# Patient Record
Sex: FEMALE | Race: WHITE | NOT HISPANIC OR LATINO | Employment: FULL TIME | URBAN - METROPOLITAN AREA
[De-identification: names, ages, dates, MRNs, and addresses within clinical notes are randomized per-mention and may not be internally consistent; named-entity substitution may affect disease eponyms.]

---

## 2022-07-18 ENCOUNTER — HOSPITAL ENCOUNTER (EMERGENCY)
Age: 47
Discharge: HOME | End: 2022-07-18
Attending: EMERGENCY MEDICINE

## 2022-07-18 ENCOUNTER — APPOINTMENT (OUTPATIENT)
Dept: GENERAL RADIOLOGY | Age: 47
End: 2022-07-18
Attending: EMERGENCY MEDICINE

## 2022-07-18 VITALS
DIASTOLIC BLOOD PRESSURE: 66 MMHG | BODY MASS INDEX: 24.93 KG/M2 | RESPIRATION RATE: 16 BRPM | WEIGHT: 174.16 LBS | HEART RATE: 55 BPM | OXYGEN SATURATION: 100 % | HEIGHT: 70 IN | SYSTOLIC BLOOD PRESSURE: 116 MMHG | TEMPERATURE: 98.1 F

## 2022-07-18 DIAGNOSIS — S90.32XA CONTUSION OF LEFT FOOT, INITIAL ENCOUNTER: Primary | ICD-10-CM

## 2022-07-18 PROCEDURE — 73630 X-RAY EXAM OF FOOT: CPT

## 2022-07-18 PROCEDURE — 99283 EMERGENCY DEPT VISIT LOW MDM: CPT | Performed by: EMERGENCY MEDICINE

## 2022-07-18 PROCEDURE — 99283 EMERGENCY DEPT VISIT LOW MDM: CPT

## 2022-07-18 ASSESSMENT — PAIN SCALES - GENERAL: PAINLEVEL_OUTOF10: 8

## 2022-07-18 ASSESSMENT — PAIN DESCRIPTION - PAIN TYPE: TYPE: ACUTE PAIN

## 2022-09-19 ENCOUNTER — OFFICE VISIT (OUTPATIENT)
Dept: RHEUMATOLOGY | Facility: CLINIC | Age: 47
End: 2022-09-19
Payer: COMMERCIAL

## 2022-09-19 ENCOUNTER — HOSPITAL ENCOUNTER (OUTPATIENT)
Dept: GENERAL RADIOLOGY | Facility: HOSPITAL | Age: 47
Discharge: HOME | End: 2022-09-19
Attending: INTERNAL MEDICINE

## 2022-09-19 ENCOUNTER — LAB ENCOUNTER (OUTPATIENT)
Dept: LAB | Facility: HOSPITAL | Age: 47
End: 2022-09-19
Attending: INTERNAL MEDICINE

## 2022-09-19 VITALS — HEIGHT: 70 IN | SYSTOLIC BLOOD PRESSURE: 105 MMHG | HEART RATE: 49 BPM | DIASTOLIC BLOOD PRESSURE: 68 MMHG

## 2022-09-19 DIAGNOSIS — R21 RASH: ICD-10-CM

## 2022-09-19 DIAGNOSIS — M25.50 POLYARTHRALGIA: ICD-10-CM

## 2022-09-19 DIAGNOSIS — G89.29 CHRONIC BILATERAL LOW BACK PAIN WITHOUT SCIATICA: ICD-10-CM

## 2022-09-19 DIAGNOSIS — M54.50 CHRONIC BILATERAL LOW BACK PAIN WITHOUT SCIATICA: ICD-10-CM

## 2022-09-19 DIAGNOSIS — M25.50 POLYARTHRALGIA: Primary | ICD-10-CM

## 2022-09-19 LAB
CRP SERPL-MCNC: <0.29 MG/DL (ref ?–0.3)
ERYTHROCYTE [SEDIMENTATION RATE] IN BLOOD: 6 MM/HR

## 2022-09-19 PROCEDURE — 86140 C-REACTIVE PROTEIN: CPT | Performed by: INTERNAL MEDICINE

## 2022-09-19 PROCEDURE — 73130 X-RAY EXAM OF HAND: CPT | Performed by: INTERNAL MEDICINE

## 2022-09-19 PROCEDURE — 81374 HLA I TYPING 1 ANTIGEN LR: CPT

## 2022-09-19 PROCEDURE — 72110 X-RAY EXAM L-2 SPINE 4/>VWS: CPT | Performed by: INTERNAL MEDICINE

## 2022-09-19 PROCEDURE — 85652 RBC SED RATE AUTOMATED: CPT | Performed by: INTERNAL MEDICINE

## 2022-09-19 PROCEDURE — 36415 COLL VENOUS BLD VENIPUNCTURE: CPT | Performed by: INTERNAL MEDICINE

## 2022-09-19 PROCEDURE — 72202 X-RAY EXAM SI JOINTS 3/> VWS: CPT | Performed by: INTERNAL MEDICINE

## 2022-09-19 RX ORDER — SERTRALINE HYDROCHLORIDE 100 MG/1
150 TABLET, FILM COATED ORAL DAILY
COMMUNITY
Start: 2022-09-15

## 2022-09-19 RX ORDER — NAPROXEN 500 MG/1
500 TABLET ORAL 2 TIMES DAILY WITH MEALS
Qty: 60 TABLET | Refills: 0 | Status: SHIPPED | OUTPATIENT
Start: 2022-09-19

## 2022-09-19 RX ORDER — GABAPENTIN 600 MG/1
600 TABLET ORAL 2 TIMES DAILY
COMMUNITY
Start: 2022-09-15

## 2022-09-19 RX ORDER — LEVOTHYROXINE SODIUM 175 UG/1
175 TABLET ORAL
COMMUNITY
Start: 2022-07-17

## 2022-09-19 NOTE — PATIENT INSTRUCTIONS
You were seen for joint pain, with this rash around your neck area I am concerned about psoriasis and even psoriatic arthritis  Blood work and x-rays today  Try naproxen twice a day for her pain, take with food and not with any other NSAIDs  See dermatology    Miguel Matthews  Address: [address], [facility]  Phone: [phone]

## 2022-09-23 LAB — HLA-B27: NEGATIVE

## 2023-01-29 ENCOUNTER — HOSPITAL ENCOUNTER (OUTPATIENT)
Age: 48
Discharge: HOME | End: 2023-01-29
Payer: COMMERCIAL

## 2023-01-29 ENCOUNTER — APPOINTMENT (OUTPATIENT)
Dept: GENERAL RADIOLOGY | Age: 48
End: 2023-01-29
Attending: PHYSICIAN ASSISTANT
Payer: COMMERCIAL

## 2023-01-29 VITALS
BODY MASS INDEX: 24.34 KG/M2 | HEIGHT: 70 IN | WEIGHT: 170 LBS | SYSTOLIC BLOOD PRESSURE: 129 MMHG | HEART RATE: 57 BPM | OXYGEN SATURATION: 98 % | DIASTOLIC BLOOD PRESSURE: 55 MMHG | RESPIRATION RATE: 18 BRPM | TEMPERATURE: 98 F

## 2023-01-29 DIAGNOSIS — M25.511 ACUTE PAIN OF RIGHT SHOULDER: Primary | ICD-10-CM

## 2023-01-29 DIAGNOSIS — M25.511 ARTHRALGIA OF RIGHT ACROMIOCLAVICULAR JOINT: ICD-10-CM

## 2023-01-29 PROCEDURE — 99204 OFFICE O/P NEW MOD 45 MIN: CPT

## 2023-01-29 PROCEDURE — 73030 X-RAY EXAM OF SHOULDER: CPT | Performed by: PHYSICIAN ASSISTANT

## 2023-01-29 PROCEDURE — 96372 THER/PROPH/DIAG INJ SC/IM: CPT

## 2023-01-29 RX ORDER — KETOROLAC TROMETHAMINE 30 MG/ML
30 INJECTION, SOLUTION INTRAMUSCULAR; INTRAVENOUS ONCE
Status: COMPLETED | OUTPATIENT
Start: 2023-01-29 | End: 2023-01-29

## 2023-01-29 RX ORDER — METHYLPREDNISOLONE 4 MG/1
TABLET ORAL
Qty: 1 EACH | Refills: 0 | Status: SHIPPED | OUTPATIENT
Start: 2023-01-29

## 2023-01-29 NOTE — DISCHARGE INSTRUCTIONS
Medrol Dosepak as directed follow-up closely with orthopedics within the next 24 to 48 hours make an appointment. The best treatment for minor injuries is R.I.C.E. and NSAID medications. R.I.C.E. = Rest  Ice  Compression  Elevation      Rest: Do not use the injured body part unnecessarily. If this is a lower extremity, do not take long walks, run or do anything that causes increased pain. Gradually progress to your normal activity, using pain as your guide. Ice: Apply cold compresses to the injured area. The area that is injured is inflammed. Inflammation causes warmth, so ice may give some relief. You may ice through a brace or ace wrap. Compression: Compression to the area will help control swelling. An ace wrap is the most simple form of compression. You can also wear a brace. Elevation: Raise the injured extremity above heart level. This will reduce throbbing pain and swelling associated with injures. Prop the injured extremity up with pillows while lying down.

## 2023-01-29 NOTE — ED INITIAL ASSESSMENT (HPI)
C/o right shoulder injury starting 5 days ago. Pt report unknown cause of injury. Pt denies heavy lifting, falls or new workouts. Limited ROM. Pt unable to hold anything on right side. Pt reports resting and icing shoulder. Pt unable to sleep on right side.

## 2023-01-30 ENCOUNTER — OFFICE VISIT (OUTPATIENT)
Dept: ORTHOPEDICS CLINIC | Facility: CLINIC | Age: 48
End: 2023-01-30
Payer: COMMERCIAL

## 2023-01-30 VITALS — WEIGHT: 170 LBS | BODY MASS INDEX: 24.34 KG/M2 | HEIGHT: 70 IN

## 2023-01-30 DIAGNOSIS — S46.001A INJURY OF RIGHT ROTATOR CUFF, INITIAL ENCOUNTER: Primary | ICD-10-CM

## 2023-01-30 PROCEDURE — 3008F BODY MASS INDEX DOCD: CPT | Performed by: PHYSICIAN ASSISTANT

## 2023-01-30 PROCEDURE — 99204 OFFICE O/P NEW MOD 45 MIN: CPT | Performed by: PHYSICIAN ASSISTANT

## 2023-02-01 ENCOUNTER — OFFICE VISIT (OUTPATIENT)
Dept: ORTHOPEDICS CLINIC | Facility: CLINIC | Age: 48
End: 2023-02-01
Payer: COMMERCIAL

## 2023-02-01 DIAGNOSIS — S46.811A STRAIN OF RIGHT SUBSCAPULARIS MUSCLE, INITIAL ENCOUNTER: ICD-10-CM

## 2023-02-01 DIAGNOSIS — M75.21 BICEPS TENDINITIS OF RIGHT UPPER EXTREMITY: Primary | ICD-10-CM

## 2023-02-01 DIAGNOSIS — S46.111A LABRAL TEAR OF LONG HEAD OF RIGHT BICEPS TENDON, INITIAL ENCOUNTER: ICD-10-CM

## 2023-02-01 RX ORDER — TRIAMCINOLONE ACETONIDE 40 MG/ML
40 INJECTION, SUSPENSION INTRA-ARTICULAR; INTRAMUSCULAR ONCE
Status: COMPLETED | OUTPATIENT
Start: 2023-02-01 | End: 2023-02-01

## 2023-02-01 RX ORDER — KETOROLAC TROMETHAMINE 30 MG/ML
30 INJECTION, SOLUTION INTRAMUSCULAR; INTRAVENOUS ONCE
Status: COMPLETED | OUTPATIENT
Start: 2023-02-01 | End: 2023-02-01

## 2023-02-01 RX ADMIN — KETOROLAC TROMETHAMINE 30 MG: 30 INJECTION, SOLUTION INTRAMUSCULAR; INTRAVENOUS at 11:59:00

## 2023-02-01 RX ADMIN — TRIAMCINOLONE ACETONIDE 40 MG: 40 INJECTION, SUSPENSION INTRA-ARTICULAR; INTRAMUSCULAR at 11:59:00

## 2023-02-01 NOTE — PROCEDURES
Right Shoulder Glenohumeral Joint Injection    Name: Shiela Newman   MRN: [id number]  Date: 2/1/2023     Clinical Indications:   Partial thickness Rotator Cuff Tear with symptoms refractory to conservative measures. After informed consent, the injection site was marked, sterilized with topical chlorhexidine antiseptic, and locally anesthetized with skin refrigerant. The patient was seated upright and the shoulder was exposed. Using sterile technique: 1 mL of 30mg/mL of Ketorolac, 2 mL of 0.5% Bupivicaine, 2 mL of 1% Lidocaine, and 1 mg of 40mg/mL of Triamcinolone (Kenalog) was injected with a Anterior approach utilizing a 21 gauge needle. A band-aid was applied. The patient tolerated the procedure well. Disposition:   Proceed with physical therapy and return for repeat evaluation in 6 weeks. No imaging required at next visit. Bibi Medel. Blue Hodgson MD  Knee, Shoulder, & Elbow Surgery / Sports Medicine Specialist  [facility]  [place] [address], [facility], [address] [place] [address]. [email or web address]  t: [phone]  o: [phone]  f: [phone]

## 2023-02-06 ENCOUNTER — TELEPHONE (OUTPATIENT)
Dept: ORTHOPEDICS CLINIC | Facility: CLINIC | Age: 48
End: 2023-02-06

## 2023-02-06 NOTE — TELEPHONE ENCOUNTER
Received paperwork from [place] today 2/6/23. Sent via email to [facility] April and sent patient mychart regarding SYD & fee.

## 2023-02-07 ENCOUNTER — TELEPHONE (OUTPATIENT)
Dept: ORTHOPEDICS CLINIC | Facility: CLINIC | Age: 48
End: 2023-02-07

## 2023-02-07 NOTE — TELEPHONE ENCOUNTER
I called the patient regarding her [employer] paperwork. If she return the call, please transfer the call to me @ [phone]    Thank you!

## 2023-02-09 NOTE — TELEPHONE ENCOUNTER
Form Received: 02/07/2023  Form Completed: 02/08/2023    Form has been faxed over to [employer] on 02/08/2023. Patient received copy of the form through [employer] as well.

## 2023-03-04 ENCOUNTER — MED REC SCAN ONLY (OUTPATIENT)
Dept: ORTHOPEDICS CLINIC | Facility: CLINIC | Age: 48
End: 2023-03-04

## 2023-03-08 ENCOUNTER — TELEPHONE (OUTPATIENT)
Dept: ORTHOPEDICS CLINIC | Facility: CLINIC | Age: 48
End: 2023-03-08

## 2023-03-08 ENCOUNTER — OFFICE VISIT (OUTPATIENT)
Dept: ORTHOPEDICS CLINIC | Facility: CLINIC | Age: 48
End: 2023-03-08
Payer: COMMERCIAL

## 2023-03-08 DIAGNOSIS — M75.21 BICEPS TENDINITIS OF RIGHT UPPER EXTREMITY: ICD-10-CM

## 2023-03-08 DIAGNOSIS — S43.003A SUBLUXATION OF TENDON OF LONG HEAD OF BICEPS: Primary | ICD-10-CM

## 2023-03-08 PROCEDURE — 99417 PROLNG OP E/M EACH 15 MIN: CPT | Performed by: ORTHOPAEDIC SURGERY

## 2023-03-08 PROCEDURE — 99215 OFFICE O/P EST HI 40 MIN: CPT | Performed by: ORTHOPAEDIC SURGERY

## 2023-03-08 NOTE — PROGRESS NOTES
[facility]  Name: Quiana Pop  MRN: [id number]   : 10/14/1975  Diagnosis:  [x] Subluxation of tendon of long head of biceps [P91.859B]  Disposition:    [x] Ambulatory  [] Overnight for MARIBEL  [] Overnight for observation and pain control  [] Inpatient procedure    Operative Time Required:  2 hrs   Antibiotics: 2 g cefazolin within 60 minutes of surgical incision  Procedure:   Laterality: [x] RIGHT [] LEFT                  [] BILATERAL  Procedures:   [x] Shoulder Arthoscopy  [] Arthrotomy (66563)  [] Arthoscopy/Arthrotomy  [x] Possible Rotator Cuff Repair (22494)  [x] Arthroscopic Biceps Tenodesis (95317)  [x] Subacromial Decompression (39166)  [x] Distal Clavicle Excision (53441)    [] SLAP repair (55297)  [] Bankart repair (66588)    [] Lysis of Adhesions / Manipulation under Anesthesia (26301)  [] Removal of Loose Body (99271)  [] Biceps tenotomy (00453)  [] ORIF clavicle (52155)  [] ORIF Proximal Humerus (24394)  [] ELVIA clavicle (55923)  [] CC ligament reconstruction (42052)   [] Graft Hamstring Auto (30097)  [] Cadaver: Hamstring allograft   [] Total Shoulder Arthroplasty (31562)   [] Reverse Shoulder Arthroplasty (76582)   [] LIPOGEMS (17394)     Additional info:   [] PCP Clearance Needed  [] MRSA  [] C-Arm  [x] TXA at time surgery  [x] Physical Therapy External - [facility]   [x] DME Rx Needed   Implants needed: Arthrex  Positioning Equipment: Yuliet Hamilton  Assistant request: Brain Pacer

## 2023-03-14 ENCOUNTER — TELEPHONE (OUTPATIENT)
Dept: ORTHOPEDICS CLINIC | Facility: CLINIC | Age: 48
End: 2023-03-14

## 2023-03-14 NOTE — TELEPHONE ENCOUNTER
Patient called and states that she was trying to contact her insurance for her Disability forms but there's no response. Mentioned to her to talk to HR if they can provide her with the forms because we usually fill them out,not create them ourselves. If she get a fax from her insurance to inform her right away because her surgery is coming up soon-  03/28 Please advise. Thanks.     Patient can be reached at [phone]

## 2023-03-16 NOTE — TELEPHONE ENCOUNTER
Spoken to the patient and she told me that her insurance company has already faxed over the form to us. However, I have not received any as of this moment. Informed the patient that if in case she received the PDF copy from her , she can also forward it through [employer] and will work on it as soon as we receive it.

## 2023-03-24 RX ORDER — TRAMADOL HYDROCHLORIDE 50 MG/1
TABLET ORAL
Qty: 20 TABLET | Refills: 1 | Status: SHIPPED | OUTPATIENT
Start: 2023-03-24

## 2023-03-24 RX ORDER — ONDANSETRON 4 MG/1
TABLET, FILM COATED ORAL
Qty: 8 TABLET | Refills: 0 | Status: SHIPPED | OUTPATIENT
Start: 2023-03-24

## 2023-03-26 ENCOUNTER — LAB ENCOUNTER (OUTPATIENT)
Dept: LAB | Facility: HOSPITAL | Age: 48
End: 2023-03-26
Attending: ORTHOPAEDIC SURGERY
Payer: COMMERCIAL

## 2023-03-26 DIAGNOSIS — S43.003A SUBLUXATION OF TENDON OF LONG HEAD OF BICEPS: ICD-10-CM

## 2023-03-27 ENCOUNTER — ANESTHESIA EVENT (OUTPATIENT)
Dept: SURGERY | Facility: HOSPITAL | Age: 48
End: 2023-03-27
Payer: COMMERCIAL

## 2023-03-27 ENCOUNTER — TELEPHONE (OUTPATIENT)
Dept: ORTHOPEDICS CLINIC | Facility: CLINIC | Age: 48
End: 2023-03-27

## 2023-03-27 LAB — SARS-COV-2 RNA RESP QL NAA+PROBE: NOT DETECTED

## 2023-03-27 NOTE — TELEPHONE ENCOUNTER
Patient is very upset and would like to speak to someone regarding paperwork that was filled out for her leave from work. Patient was notified that the leave has been denied and she is suppose to be having surgery tomorrow. Patient stated that the paperwork has been filled out incorrectly.  Please advise

## 2023-03-28 ENCOUNTER — HOSPITAL ENCOUNTER (OUTPATIENT)
Facility: HOSPITAL | Age: 48
Setting detail: HOSPITAL OUTPATIENT SURGERY
Discharge: HOME | End: 2023-03-28
Attending: ORTHOPAEDIC SURGERY | Admitting: ORTHOPAEDIC SURGERY
Payer: COMMERCIAL

## 2023-03-28 ENCOUNTER — ANESTHESIA (OUTPATIENT)
Dept: SURGERY | Facility: HOSPITAL | Age: 48
End: 2023-03-28
Payer: COMMERCIAL

## 2023-03-28 VITALS
SYSTOLIC BLOOD PRESSURE: 101 MMHG | BODY MASS INDEX: 25.48 KG/M2 | HEART RATE: 55 BPM | WEIGHT: 178 LBS | RESPIRATION RATE: 20 BRPM | DIASTOLIC BLOOD PRESSURE: 69 MMHG | HEIGHT: 70 IN | TEMPERATURE: 97 F | OXYGEN SATURATION: 100 %

## 2023-03-28 DIAGNOSIS — S43.003A SUBLUXATION OF TENDON OF LONG HEAD OF BICEPS: Primary | ICD-10-CM

## 2023-03-28 DIAGNOSIS — Z98.890 S/P ARTHROSCOPY OF RIGHT SHOULDER: ICD-10-CM

## 2023-03-28 LAB — B-HCG UR QL: NEGATIVE

## 2023-03-28 PROCEDURE — 0RNJ4ZZ RELEASE RIGHT SHOULDER JOINT, PERCUTANEOUS ENDOSCOPIC APPROACH: ICD-10-PCS | Performed by: ORTHOPAEDIC SURGERY

## 2023-03-28 PROCEDURE — 0LS34ZZ REPOSITION RIGHT UPPER ARM TENDON, PERCUTANEOUS ENDOSCOPIC APPROACH: ICD-10-PCS | Performed by: ORTHOPAEDIC SURGERY

## 2023-03-28 PROCEDURE — 81025 URINE PREGNANCY TEST: CPT

## 2023-03-28 PROCEDURE — 0PB94ZZ EXCISION OF RIGHT CLAVICLE, PERCUTANEOUS ENDOSCOPIC APPROACH: ICD-10-PCS | Performed by: ORTHOPAEDIC SURGERY

## 2023-03-28 PROCEDURE — 0RHJ44Z INSERTION OF INTERNAL FIXATION DEVICE INTO RIGHT SHOULDER JOINT, PERCUTANEOUS ENDOSCOPIC APPROACH: ICD-10-PCS | Performed by: ORTHOPAEDIC SURGERY

## 2023-03-28 PROCEDURE — 76942 ECHO GUIDE FOR BIOPSY: CPT | Performed by: ANESTHESIOLOGY

## 2023-03-28 DEVICE — PEEK SWIVELOCK C,4.75X19.1MM
Type: IMPLANTABLE DEVICE | Site: SHOULDER | Status: FUNCTIONAL
Brand: ARTHREX®

## 2023-03-28 RX ORDER — ACETAMINOPHEN 500 MG
1000 TABLET ORAL ONCE AS NEEDED
Status: COMPLETED | OUTPATIENT
Start: 2023-03-28 | End: 2023-03-28

## 2023-03-28 RX ORDER — NALOXONE HYDROCHLORIDE 0.4 MG/ML
80 INJECTION, SOLUTION INTRAMUSCULAR; INTRAVENOUS; SUBCUTANEOUS AS NEEDED
Status: DISCONTINUED | OUTPATIENT
Start: 2023-03-28 | End: 2023-03-28

## 2023-03-28 RX ORDER — PROCHLORPERAZINE EDISYLATE 5 MG/ML
INJECTION INTRAMUSCULAR; INTRAVENOUS
Status: COMPLETED
Start: 2023-03-28 | End: 2023-03-28

## 2023-03-28 RX ORDER — QUETIAPINE FUMARATE 25 MG/1
50 TABLET, FILM COATED ORAL NIGHTLY
COMMUNITY
Start: 2022-08-16

## 2023-03-28 RX ORDER — HYDROCODONE BITARTRATE AND ACETAMINOPHEN 5; 325 MG/1; MG/1
2 TABLET ORAL ONCE AS NEEDED
Status: COMPLETED | OUTPATIENT
Start: 2023-03-28 | End: 2023-03-28

## 2023-03-28 RX ORDER — TRANEXAMIC ACID 10 MG/ML
1000 INJECTION, SOLUTION INTRAVENOUS ONCE
Status: COMPLETED | OUTPATIENT
Start: 2023-03-28 | End: 2023-03-28

## 2023-03-28 RX ORDER — HYDROMORPHONE HYDROCHLORIDE 1 MG/ML
0.2 INJECTION, SOLUTION INTRAMUSCULAR; INTRAVENOUS; SUBCUTANEOUS EVERY 5 MIN PRN
Status: DISCONTINUED | OUTPATIENT
Start: 2023-03-28 | End: 2023-03-28

## 2023-03-28 RX ORDER — MIDAZOLAM HYDROCHLORIDE 1 MG/ML
1 INJECTION INTRAMUSCULAR; INTRAVENOUS EVERY 5 MIN PRN
Status: DISCONTINUED | OUTPATIENT
Start: 2023-03-28 | End: 2023-03-28

## 2023-03-28 RX ORDER — CEFAZOLIN SODIUM/WATER 2 G/20 ML
2 SYRINGE (ML) INTRAVENOUS ONCE
Status: COMPLETED | OUTPATIENT
Start: 2023-03-28 | End: 2023-03-28

## 2023-03-28 RX ORDER — ONDANSETRON 2 MG/ML
INJECTION INTRAMUSCULAR; INTRAVENOUS
Status: COMPLETED
Start: 2023-03-28 | End: 2023-03-28

## 2023-03-28 RX ORDER — ACETAMINOPHEN 500 MG
1000 TABLET ORAL ONCE
Status: DISCONTINUED | OUTPATIENT
Start: 2023-03-28 | End: 2023-03-28 | Stop reason: HOSPADM

## 2023-03-28 RX ORDER — HYDROCODONE BITARTRATE AND ACETAMINOPHEN 5; 325 MG/1; MG/1
1 TABLET ORAL ONCE AS NEEDED
Status: COMPLETED | OUTPATIENT
Start: 2023-03-28 | End: 2023-03-28

## 2023-03-28 RX ORDER — MIDAZOLAM HYDROCHLORIDE 1 MG/ML
INJECTION INTRAMUSCULAR; INTRAVENOUS AS NEEDED
Status: DISCONTINUED | OUTPATIENT
Start: 2023-03-28 | End: 2023-03-28 | Stop reason: SURG

## 2023-03-28 RX ORDER — DEXAMETHASONE SODIUM PHOSPHATE 4 MG/ML
VIAL (ML) INJECTION AS NEEDED
Status: DISCONTINUED | OUTPATIENT
Start: 2023-03-28 | End: 2023-03-28 | Stop reason: SURG

## 2023-03-28 RX ORDER — SODIUM CHLORIDE, SODIUM LACTATE, POTASSIUM CHLORIDE, CALCIUM CHLORIDE 600; 310; 30; 20 MG/100ML; MG/100ML; MG/100ML; MG/100ML
INJECTION, SOLUTION INTRAVENOUS CONTINUOUS
Status: DISCONTINUED | OUTPATIENT
Start: 2023-03-28 | End: 2023-03-28

## 2023-03-28 RX ORDER — MEPERIDINE HYDROCHLORIDE 25 MG/ML
12.5 INJECTION INTRAMUSCULAR; INTRAVENOUS; SUBCUTANEOUS AS NEEDED
Status: DISCONTINUED | OUTPATIENT
Start: 2023-03-28 | End: 2023-03-28

## 2023-03-28 RX ORDER — ONDANSETRON 2 MG/ML
4 INJECTION INTRAMUSCULAR; INTRAVENOUS EVERY 6 HOURS PRN
Status: DISCONTINUED | OUTPATIENT
Start: 2023-03-28 | End: 2023-03-28

## 2023-03-28 RX ORDER — EPHEDRINE SULFATE 50 MG/ML
INJECTION INTRAVENOUS AS NEEDED
Status: DISCONTINUED | OUTPATIENT
Start: 2023-03-28 | End: 2023-03-28 | Stop reason: SURG

## 2023-03-28 RX ORDER — SCOLOPAMINE TRANSDERMAL SYSTEM 1 MG/1
1 PATCH, EXTENDED RELEASE TRANSDERMAL ONCE
Status: DISCONTINUED | OUTPATIENT
Start: 2023-03-28 | End: 2023-03-28

## 2023-03-28 RX ORDER — ONDANSETRON 2 MG/ML
INJECTION INTRAMUSCULAR; INTRAVENOUS AS NEEDED
Status: DISCONTINUED | OUTPATIENT
Start: 2023-03-28 | End: 2023-03-28 | Stop reason: SURG

## 2023-03-28 RX ORDER — PROCHLORPERAZINE EDISYLATE 5 MG/ML
5 INJECTION INTRAMUSCULAR; INTRAVENOUS EVERY 8 HOURS PRN
Status: DISCONTINUED | OUTPATIENT
Start: 2023-03-28 | End: 2023-03-28

## 2023-03-28 RX ORDER — HYDROMORPHONE HYDROCHLORIDE 1 MG/ML
0.4 INJECTION, SOLUTION INTRAMUSCULAR; INTRAVENOUS; SUBCUTANEOUS EVERY 5 MIN PRN
Status: DISCONTINUED | OUTPATIENT
Start: 2023-03-28 | End: 2023-03-28

## 2023-03-28 RX ORDER — LIDOCAINE HYDROCHLORIDE 10 MG/ML
INJECTION, SOLUTION EPIDURAL; INFILTRATION; INTRACAUDAL; PERINEURAL AS NEEDED
Status: DISCONTINUED | OUTPATIENT
Start: 2023-03-28 | End: 2023-03-28 | Stop reason: SURG

## 2023-03-28 RX ORDER — HYDROMORPHONE HYDROCHLORIDE 1 MG/ML
0.6 INJECTION, SOLUTION INTRAMUSCULAR; INTRAVENOUS; SUBCUTANEOUS EVERY 5 MIN PRN
Status: DISCONTINUED | OUTPATIENT
Start: 2023-03-28 | End: 2023-03-28

## 2023-03-28 RX ADMIN — LIDOCAINE HYDROCHLORIDE 50 MG: 10 INJECTION, SOLUTION EPIDURAL; INFILTRATION; INTRACAUDAL; PERINEURAL at 08:59:00

## 2023-03-28 RX ADMIN — ONDANSETRON 4 MG: 2 INJECTION INTRAMUSCULAR; INTRAVENOUS at 10:11:00

## 2023-03-28 RX ADMIN — TRANEXAMIC ACID 1000 MG: 10 INJECTION, SOLUTION INTRAVENOUS at 09:21:00

## 2023-03-28 RX ADMIN — SODIUM CHLORIDE, SODIUM LACTATE, POTASSIUM CHLORIDE, CALCIUM CHLORIDE: 600; 310; 30; 20 INJECTION, SOLUTION INTRAVENOUS at 08:55:00

## 2023-03-28 RX ADMIN — MIDAZOLAM HYDROCHLORIDE 4 MG: 1 INJECTION INTRAMUSCULAR; INTRAVENOUS at 08:51:00

## 2023-03-28 RX ADMIN — EPHEDRINE SULFATE 10 MG: 50 INJECTION INTRAVENOUS at 09:19:00

## 2023-03-28 RX ADMIN — CEFAZOLIN SODIUM/WATER 2 G: 2 G/20 ML SYRINGE (ML) INTRAVENOUS at 09:10:00

## 2023-03-28 RX ADMIN — SODIUM CHLORIDE, SODIUM LACTATE, POTASSIUM CHLORIDE, CALCIUM CHLORIDE: 600; 310; 30; 20 INJECTION, SOLUTION INTRAVENOUS at 10:48:00

## 2023-03-28 RX ADMIN — DEXAMETHASONE SODIUM PHOSPHATE 8 MG: 4 MG/ML VIAL (ML) INJECTION at 10:11:00

## 2023-03-28 NOTE — BRIEF OP NOTE
Pre-Operative Diagnosis: Subluxation of tendon of long head of biceps [S43.003A]     Post-Operative Diagnosis: Subluxation of tendon of long head of biceps [S43.003A]      Procedure Performed:   RIGHT SHOULDER ARTHROSCOPY,  ROTATOR CUFF REPAIR, BICEPS TENODESIS, SUBACROMIAL DECOMPRESSION, DISTAL CLAVICLE EXCISION. Surgeon(s) and Role:     * Berna Arias MD - Primary    Assistant(s):  PA: RICHI Carmichael     Surgical Findings: On arthroscopic examination there was evidence of a partial subscapularis tear, and biceps tendon interstitial tearing and subluxation. A biceps tenodesis and subscapularis rotator cuff repair was performed without difficulty. A subacromial decompression, and distal clavicle excision were also performed. Portal incisions were closed in standard fashion, and operative arm placed in shoulder sling.      Estimated Blood Loss: Blood Output: 5 mL (3/28/2023 10:26 AM)      RICHI Dickerson  3/28/2023  10:50 AM

## 2023-03-28 NOTE — ANESTHESIA PROCEDURE NOTES
Airway  Date/Time: 3/28/2023 9:00 AM  Urgency: elective    Airway not difficult    General Information and Staff    Patient location during procedure: OR  Anesthesiologist: Tia Youngblood MD  Performed: anesthesiologist   Performed by: Tia Youngblood MD  Authorized by: Tia Youngblood MD      Indications and Patient Condition  Indications for airway management: anesthesia  Sedation level: deep  Preoxygenated: yes  Patient position: sniffing  Mask difficulty assessment: 1 - vent by mask  Planned trial extubation    Final Airway Details  Final airway type: supraglottic airway      Successful airway: classic  Size 3       Number of attempts at approach: 1

## 2023-03-28 NOTE — ANESTHESIA PROCEDURE NOTES
Regional Block    Date/Time: 3/28/2023 8:53 AM    Performed by: Babar Baez MD  Authorized by: Babar Baez MD      General Information and Staff    Start Time:  3/28/2023 8:53 AM  End Time:  3/28/2023 8:55 AM  Anesthesiologist:  Babar Baez MD  Performed by: Anesthesiologist  Patient Location:  OR    Block Placement: Pre Induction  Site Identification: real time ultrasound guided and image stored and retrievable    Block site/laterality marked before start: site marked  Reason for Block: at surgeon's request and post-op pain management    Preanesthetic Checklist: 2 patient identifers, IV checked, risks and benefits discussed, monitors and equipment checked, pre-op evaluation, timeout performed, anesthesia consent, sterile technique used, no prohibitive neurological deficits and no local skin infection at insertion site      Procedure Details    Patient Position:  Supine  Prep: ChloraPrep    Monitoring:  Cardiac monitor, continuous pulse ox and blood pressure cuff  Block Type:  Supraclavicular  Laterality:  Right  Injection Technique:  Single-shot    Needle    Needle Type:  Short-bevel and echogenic  Needle Gauge:  22 G  Needle Length:  50 mm  Needle Localization:  Ultrasound guidance  Reason for Ultrasound Use: appropriate spread of the medication was noted in real time and no ultrasound evidence of intravascular and/or intraneural injection            Assessment    Injection Assessment:  Good spread noted, negative resistance, negative aspiration for heme, incremental injection and low pressure  Heart Rate Change: No    - Patient tolerated block procedure well without evidence of immediate block related complications.      Medications  3/28/2023 8:53 AM      Additional Comments    Medication:  Ropivacaine 0.5% 30mL  with 1:200,000 epi

## 2023-03-28 NOTE — TELEPHONE ENCOUNTER
This has been addressed by Dr Pat Montiel yesterday. There is no error in the dates given for her FMLA. The patient did not go back to work while waiting for her surgery date. She thought she was still cover from the last visit until today for her Surgery.

## 2023-04-07 ENCOUNTER — OFFICE VISIT (OUTPATIENT)
Dept: ORTHOPEDICS CLINIC | Facility: CLINIC | Age: 48
End: 2023-04-07
Payer: COMMERCIAL

## 2023-04-07 DIAGNOSIS — Z98.890 S/P ARTHROSCOPY OF SHOULDER: Primary | ICD-10-CM

## 2023-04-07 PROCEDURE — 99024 POSTOP FOLLOW-UP VISIT: CPT | Performed by: PHYSICIAN ASSISTANT

## 2023-04-11 ENCOUNTER — PATIENT MESSAGE (OUTPATIENT)
Dept: ORTHOPEDICS CLINIC | Facility: CLINIC | Age: 48
End: 2023-04-11

## 2023-04-14 ENCOUNTER — PATIENT MESSAGE (OUTPATIENT)
Dept: ORTHOPEDICS CLINIC | Facility: CLINIC | Age: 48
End: 2023-04-14

## 2023-04-14 NOTE — TELEPHONE ENCOUNTER
DOS:03/28/23    Patient has tramadol - side effect itching (flaring up eczema)  Pain increased   Patient called states she is not willing to do other narcotic pain management due to the itching. She states she has tried benadryl and it was not effective. Patient was encouraged to rest and be aware of not doing to much.   She states she has overdone it and increase rest.

## 2023-05-01 ENCOUNTER — MED REC SCAN ONLY (OUTPATIENT)
Dept: ORTHOPEDICS CLINIC | Facility: CLINIC | Age: 48
End: 2023-05-01

## 2023-05-05 ENCOUNTER — OFFICE VISIT (OUTPATIENT)
Dept: ORTHOPEDICS CLINIC | Facility: CLINIC | Age: 48
End: 2023-05-05
Payer: COMMERCIAL

## 2023-05-05 DIAGNOSIS — Z98.890 S/P ARTHROSCOPY OF SHOULDER: Primary | ICD-10-CM

## 2023-05-05 PROCEDURE — 99024 POSTOP FOLLOW-UP VISIT: CPT | Performed by: PHYSICIAN ASSISTANT

## 2023-05-17 ENCOUNTER — OFFICE VISIT (OUTPATIENT)
Dept: FAMILY MEDICINE CLINIC | Facility: CLINIC | Age: 48
End: 2023-05-17
Payer: COMMERCIAL

## 2023-05-17 ENCOUNTER — LAB ENCOUNTER (OUTPATIENT)
Dept: LAB | Age: 48
End: 2023-05-17
Attending: STUDENT IN AN ORGANIZED HEALTH CARE EDUCATION/TRAINING PROGRAM
Payer: COMMERCIAL

## 2023-05-17 VITALS
HEART RATE: 100 BPM | HEIGHT: 70 IN | RESPIRATION RATE: 18 BRPM | WEIGHT: 182.81 LBS | DIASTOLIC BLOOD PRESSURE: 62 MMHG | TEMPERATURE: 97 F | BODY MASS INDEX: 26.17 KG/M2 | SYSTOLIC BLOOD PRESSURE: 98 MMHG

## 2023-05-17 DIAGNOSIS — Z00.00 WELLNESS EXAMINATION: Primary | ICD-10-CM

## 2023-05-17 DIAGNOSIS — Z12.4 SCREENING FOR MALIGNANT NEOPLASM OF CERVIX: ICD-10-CM

## 2023-05-17 DIAGNOSIS — I73.00 RAYNAUD'S PHENOMENON WITHOUT GANGRENE: ICD-10-CM

## 2023-05-17 DIAGNOSIS — Z12.31 ENCOUNTER FOR SCREENING MAMMOGRAM FOR MALIGNANT NEOPLASM OF BREAST: ICD-10-CM

## 2023-05-17 DIAGNOSIS — Z12.11 ENCOUNTER FOR SCREENING FOR MALIGNANT NEOPLASM OF COLON: ICD-10-CM

## 2023-05-17 DIAGNOSIS — E06.3 HYPOTHYROIDISM, ACQUIRED, AUTOIMMUNE: ICD-10-CM

## 2023-05-17 DIAGNOSIS — Z00.00 WELLNESS EXAMINATION: ICD-10-CM

## 2023-05-17 DIAGNOSIS — J30.2 SEASONAL ALLERGIES: ICD-10-CM

## 2023-05-17 DIAGNOSIS — Z91.018 TREE NUT ALLERGY: ICD-10-CM

## 2023-05-17 DIAGNOSIS — R25.3 EYELID TWITCH: ICD-10-CM

## 2023-05-17 PROBLEM — F41.9 ANXIETY: Status: ACTIVE | Noted: 2018-12-27

## 2023-05-17 PROBLEM — L98.9 SKIN ABNORMALITY: Status: ACTIVE | Noted: 2022-05-02

## 2023-05-17 PROBLEM — E03.9 HYPOTHYROID: Status: ACTIVE | Noted: 2022-05-02

## 2023-05-17 LAB
ALBUMIN SERPL-MCNC: 4.4 G/DL (ref 3.4–5)
ALBUMIN/GLOB SERPL: 1.3 {RATIO} (ref 1–2)
ALP LIVER SERPL-CCNC: 46 U/L
ALT SERPL-CCNC: 220 U/L
ANION GAP SERPL CALC-SCNC: 5 MMOL/L (ref 0–18)
AST SERPL-CCNC: 154 U/L (ref 15–37)
BASOPHILS # BLD AUTO: 0.05 X10(3) UL (ref 0–0.2)
BASOPHILS NFR BLD AUTO: 0.8 %
BILIRUB SERPL-MCNC: 0.4 MG/DL (ref 0.1–2)
BILIRUB UR QL STRIP.AUTO: NEGATIVE
BUN BLD-MCNC: 11 MG/DL (ref 7–18)
CALCIUM BLD-MCNC: 9.3 MG/DL (ref 8.5–10.1)
CHLORIDE SERPL-SCNC: 106 MMOL/L (ref 98–112)
CHOLEST SERPL-MCNC: 182 MG/DL (ref ?–200)
CLARITY UR REFRACT.AUTO: CLEAR
CO2 SERPL-SCNC: 26 MMOL/L (ref 21–32)
COLOR UR AUTO: YELLOW
CREAT BLD-MCNC: 0.93 MG/DL
EOSINOPHIL # BLD AUTO: 0.27 X10(3) UL (ref 0–0.7)
EOSINOPHIL NFR BLD AUTO: 4.5 %
ERYTHROCYTE [DISTWIDTH] IN BLOOD BY AUTOMATED COUNT: 12.2 %
FASTING PATIENT LIPID ANSWER: YES
FASTING STATUS PATIENT QL REPORTED: YES
GFR SERPLBLD BASED ON 1.73 SQ M-ARVRAT: 77 ML/MIN/1.73M2 (ref 60–?)
GLOBULIN PLAS-MCNC: 3.3 G/DL (ref 2.8–4.4)
GLUCOSE BLD-MCNC: 87 MG/DL (ref 70–99)
GLUCOSE UR STRIP.AUTO-MCNC: NEGATIVE MG/DL
HCT VFR BLD AUTO: 39.7 %
HDLC SERPL-MCNC: 75 MG/DL (ref 40–59)
HGB BLD-MCNC: 12.9 G/DL
IMM GRANULOCYTES # BLD AUTO: 0.01 X10(3) UL (ref 0–1)
IMM GRANULOCYTES NFR BLD: 0.2 %
KETONES UR STRIP.AUTO-MCNC: NEGATIVE MG/DL
LDLC SERPL CALC-MCNC: 96 MG/DL (ref ?–100)
LEUKOCYTE ESTERASE UR QL STRIP.AUTO: NEGATIVE
LYMPHOCYTES # BLD AUTO: 1.62 X10(3) UL (ref 1–4)
LYMPHOCYTES NFR BLD AUTO: 26.7 %
MCH RBC QN AUTO: 31.7 PG (ref 26–34)
MCHC RBC AUTO-ENTMCNC: 32.5 G/DL (ref 31–37)
MCV RBC AUTO: 97.5 FL
MONOCYTES # BLD AUTO: 0.29 X10(3) UL (ref 0.1–1)
MONOCYTES NFR BLD AUTO: 4.8 %
NEUTROPHILS # BLD AUTO: 3.82 X10 (3) UL (ref 1.5–7.7)
NEUTROPHILS # BLD AUTO: 3.82 X10(3) UL (ref 1.5–7.7)
NEUTROPHILS NFR BLD AUTO: 63 %
NITRITE UR QL STRIP.AUTO: NEGATIVE
NONHDLC SERPL-MCNC: 107 MG/DL (ref ?–130)
OSMOLALITY SERPL CALC.SUM OF ELEC: 283 MOSM/KG (ref 275–295)
PH UR STRIP.AUTO: 7 [PH] (ref 5–8)
PLATELET # BLD AUTO: 223 10(3)UL (ref 150–450)
POTASSIUM SERPL-SCNC: 4.1 MMOL/L (ref 3.5–5.1)
PROT SERPL-MCNC: 7.7 G/DL (ref 6.4–8.2)
PROT UR STRIP.AUTO-MCNC: NEGATIVE MG/DL
RBC # BLD AUTO: 4.07 X10(6)UL
RBC UR QL AUTO: NEGATIVE
SODIUM SERPL-SCNC: 137 MMOL/L (ref 136–145)
SP GR UR STRIP.AUTO: 1 (ref 1–1.03)
T4 FREE SERPL-MCNC: 0.3 NG/DL (ref 0.8–1.7)
TRIGL SERPL-MCNC: 59 MG/DL (ref 30–149)
TSI SER-ACNC: >100 MIU/ML (ref 0.36–3.74)
UROBILINOGEN UR STRIP.AUTO-MCNC: <2 MG/DL
VLDLC SERPL CALC-MCNC: 10 MG/DL (ref 0–30)
WBC # BLD AUTO: 6.1 X10(3) UL (ref 4–11)

## 2023-05-17 PROCEDURE — 99204 OFFICE O/P NEW MOD 45 MIN: CPT | Performed by: STUDENT IN AN ORGANIZED HEALTH CARE EDUCATION/TRAINING PROGRAM

## 2023-05-17 PROCEDURE — 3008F BODY MASS INDEX DOCD: CPT | Performed by: STUDENT IN AN ORGANIZED HEALTH CARE EDUCATION/TRAINING PROGRAM

## 2023-05-17 PROCEDURE — 87624 HPV HI-RISK TYP POOLED RSLT: CPT | Performed by: STUDENT IN AN ORGANIZED HEALTH CARE EDUCATION/TRAINING PROGRAM

## 2023-05-17 PROCEDURE — 84439 ASSAY OF FREE THYROXINE: CPT

## 2023-05-17 PROCEDURE — 3074F SYST BP LT 130 MM HG: CPT | Performed by: STUDENT IN AN ORGANIZED HEALTH CARE EDUCATION/TRAINING PROGRAM

## 2023-05-17 PROCEDURE — 80053 COMPREHEN METABOLIC PANEL: CPT

## 2023-05-17 PROCEDURE — 3078F DIAST BP <80 MM HG: CPT | Performed by: STUDENT IN AN ORGANIZED HEALTH CARE EDUCATION/TRAINING PROGRAM

## 2023-05-17 PROCEDURE — 99386 PREV VISIT NEW AGE 40-64: CPT | Performed by: STUDENT IN AN ORGANIZED HEALTH CARE EDUCATION/TRAINING PROGRAM

## 2023-05-17 PROCEDURE — 81003 URINALYSIS AUTO W/O SCOPE: CPT

## 2023-05-17 PROCEDURE — 84443 ASSAY THYROID STIM HORMONE: CPT

## 2023-05-17 PROCEDURE — 80061 LIPID PANEL: CPT

## 2023-05-17 PROCEDURE — 85025 COMPLETE CBC W/AUTO DIFF WBC: CPT

## 2023-05-17 RX ORDER — MONTELUKAST SODIUM 10 MG/1
10 TABLET ORAL NIGHTLY
Qty: 90 TABLET | Refills: 1 | Status: SHIPPED | OUTPATIENT
Start: 2023-05-17

## 2023-05-17 RX ORDER — EPINEPHRINE 0.3 MG/.3ML
0.3 INJECTION SUBCUTANEOUS ONCE
Qty: 1 EACH | Refills: 0 | Status: SHIPPED | OUTPATIENT
Start: 2023-05-17 | End: 2023-05-17

## 2023-05-17 RX ORDER — LEVOTHYROXINE SODIUM 175 UG/1
175 TABLET ORAL
COMMUNITY
Start: 2022-05-03 | End: 2023-05-17 | Stop reason: ALTCHOICE

## 2023-05-17 RX ORDER — M-VIT,TX,IRON,MINS/CALC/FOLIC 27MG-0.4MG
1 TABLET ORAL DAILY
COMMUNITY

## 2023-05-17 RX ORDER — GABAPENTIN 400 MG/1
800 CAPSULE ORAL 4 TIMES DAILY
COMMUNITY
Start: 2023-04-25

## 2023-05-18 DIAGNOSIS — R79.89 ABNORMAL TSH: ICD-10-CM

## 2023-05-18 DIAGNOSIS — E06.3 HYPOTHYROIDISM, ACQUIRED, AUTOIMMUNE: ICD-10-CM

## 2023-05-18 DIAGNOSIS — R79.89 ABNORMAL LIVER FUNCTION TESTS: Primary | ICD-10-CM

## 2023-05-18 LAB — HPV I/H RISK 1 DNA SPEC QL NAA+PROBE: NEGATIVE

## 2023-05-18 RX ORDER — EPINEPHRINE 0.3 MG/.3ML
INJECTION SUBCUTANEOUS
COMMUNITY
Start: 2023-05-17

## 2023-05-18 RX ORDER — LEVOTHYROXINE SODIUM 175 UG/1
175 TABLET ORAL
Qty: 90 TABLET | Refills: 0 | Status: SHIPPED | OUTPATIENT
Start: 2023-05-18

## 2023-06-16 ENCOUNTER — LAB ENCOUNTER (OUTPATIENT)
Dept: LAB | Age: 48
End: 2023-06-16
Attending: STUDENT IN AN ORGANIZED HEALTH CARE EDUCATION/TRAINING PROGRAM
Payer: COMMERCIAL

## 2023-06-16 DIAGNOSIS — R79.89 ABNORMAL TSH: ICD-10-CM

## 2023-06-16 DIAGNOSIS — R79.89 ABNORMAL LIVER FUNCTION TESTS: ICD-10-CM

## 2023-06-16 DIAGNOSIS — E06.3 HYPOTHYROIDISM, ACQUIRED, AUTOIMMUNE: ICD-10-CM

## 2023-06-16 LAB
ALBUMIN SERPL-MCNC: 4.1 G/DL (ref 3.4–5)
ALP LIVER SERPL-CCNC: 42 U/L
ALT SERPL-CCNC: 34 U/L
AST SERPL-CCNC: 19 U/L (ref 15–37)
BILIRUB DIRECT SERPL-MCNC: <0.1 MG/DL (ref 0–0.2)
BILIRUB SERPL-MCNC: 0.5 MG/DL (ref 0.1–2)
PROT SERPL-MCNC: 7.3 G/DL (ref 6.4–8.2)
TSI SER-ACNC: 1.3 MIU/ML (ref 0.36–3.74)

## 2023-06-16 PROCEDURE — 84443 ASSAY THYROID STIM HORMONE: CPT

## 2023-06-16 PROCEDURE — 80076 HEPATIC FUNCTION PANEL: CPT

## 2023-06-26 ENCOUNTER — HOSPITAL ENCOUNTER (OUTPATIENT)
Dept: MAMMOGRAPHY | Age: 48
Discharge: HOME | End: 2023-06-26
Attending: STUDENT IN AN ORGANIZED HEALTH CARE EDUCATION/TRAINING PROGRAM
Payer: COMMERCIAL

## 2023-06-26 DIAGNOSIS — Z12.31 ENCOUNTER FOR SCREENING MAMMOGRAM FOR MALIGNANT NEOPLASM OF BREAST: ICD-10-CM

## 2023-06-26 PROCEDURE — 77063 BREAST TOMOSYNTHESIS BI: CPT | Performed by: STUDENT IN AN ORGANIZED HEALTH CARE EDUCATION/TRAINING PROGRAM

## 2023-06-26 PROCEDURE — 77067 SCR MAMMO BI INCL CAD: CPT | Performed by: STUDENT IN AN ORGANIZED HEALTH CARE EDUCATION/TRAINING PROGRAM

## 2023-06-30 ENCOUNTER — TELEPHONE (OUTPATIENT)
Dept: FAMILY MEDICINE CLINIC | Facility: CLINIC | Age: 48
End: 2023-06-30

## 2023-06-30 ENCOUNTER — OFFICE VISIT (OUTPATIENT)
Dept: ORTHOPEDICS CLINIC | Facility: CLINIC | Age: 48
End: 2023-06-30
Payer: COMMERCIAL

## 2023-06-30 DIAGNOSIS — Z98.890 S/P ARTHROSCOPY OF SHOULDER: Primary | ICD-10-CM

## 2023-06-30 PROCEDURE — 99213 OFFICE O/P EST LOW 20 MIN: CPT | Performed by: ORTHOPAEDIC SURGERY

## 2023-07-03 ENCOUNTER — MED REC SCAN ONLY (OUTPATIENT)
Dept: ORTHOPEDICS CLINIC | Facility: CLINIC | Age: 48
End: 2023-07-03

## 2023-07-19 ENCOUNTER — HOSPITAL ENCOUNTER (OUTPATIENT)
Dept: MAMMOGRAPHY | Facility: HOSPITAL | Age: 48
Discharge: HOME | End: 2023-07-19
Attending: STUDENT IN AN ORGANIZED HEALTH CARE EDUCATION/TRAINING PROGRAM
Payer: COMMERCIAL

## 2023-07-19 DIAGNOSIS — R92.2 INCONCLUSIVE MAMMOGRAM: ICD-10-CM

## 2023-07-19 PROCEDURE — 76642 ULTRASOUND BREAST LIMITED: CPT | Performed by: STUDENT IN AN ORGANIZED HEALTH CARE EDUCATION/TRAINING PROGRAM

## 2023-07-19 PROCEDURE — 77066 DX MAMMO INCL CAD BI: CPT | Performed by: STUDENT IN AN ORGANIZED HEALTH CARE EDUCATION/TRAINING PROGRAM

## 2023-07-19 PROCEDURE — 77062 BREAST TOMOSYNTHESIS BI: CPT | Performed by: STUDENT IN AN ORGANIZED HEALTH CARE EDUCATION/TRAINING PROGRAM

## 2023-07-20 DIAGNOSIS — R92.1 BREAST CALCIFICATIONS: Primary | ICD-10-CM

## 2023-07-24 ENCOUNTER — TELEPHONE (OUTPATIENT)
Dept: FAMILY MEDICINE CLINIC | Facility: CLINIC | Age: 48
End: 2023-07-24

## 2023-07-24 DIAGNOSIS — R92.1 CALCIFICATION OF RIGHT BREAST: Primary | ICD-10-CM

## 2023-07-24 NOTE — TELEPHONE ENCOUNTER
The radiologist reviewed the images again and reached out to me and recommended 6 weeks instead of 6 months. Thank you.

## 2023-07-24 NOTE — TELEPHONE ENCOUNTER
Disregard message below from Dr. Catherine Restrepo. We discussed this and pt is to repeat mammogram in 6 months. Dr. Catherine Restrepo reviewed mammogram report with me and agreed. Order placed for 6 months.

## 2023-07-24 NOTE — TELEPHONE ENCOUNTER
I spoke to the patient about her mammogram results and the change in the recommendation for a right breast mammogram follow up. Radiology report says 6 months, your recommendation is 6 weeks. Please clarify when pt should repeat right breast mammogram?  Dr. Milagros Brenner received a in box message from the radiologist and it originally said to repeat mammogram in 6 weeks. We reviewed the radiology report and it did confirm for the pt to repeat the right breat mammogram in 6 months. 07/24 LM for pt to CB. Please tell her to repeat right breast mammogram in 6 months.

## 2023-07-24 NOTE — TELEPHONE ENCOUNTER
I received correspondence from radiologist that patient should repeat mammogram on the right breast to review the calcifications in 6 weeks. Please let patient know. Thank you.

## 2023-07-28 ENCOUNTER — OFFICE VISIT (OUTPATIENT)
Dept: ORTHOPEDICS CLINIC | Facility: CLINIC | Age: 48
End: 2023-07-28
Payer: COMMERCIAL

## 2023-07-28 DIAGNOSIS — Z98.890 S/P ARTHROSCOPY OF SHOULDER: Primary | ICD-10-CM

## 2023-07-28 PROCEDURE — 99213 OFFICE O/P EST LOW 20 MIN: CPT | Performed by: ORTHOPAEDIC SURGERY

## 2023-08-17 ENCOUNTER — MED REC SCAN ONLY (OUTPATIENT)
Dept: ORTHOPEDICS CLINIC | Facility: CLINIC | Age: 48
End: 2023-08-17

## 2023-08-31 DIAGNOSIS — E06.3 HYPOTHYROIDISM, ACQUIRED, AUTOIMMUNE: ICD-10-CM

## 2023-08-31 DIAGNOSIS — R79.89 ABNORMAL TSH: ICD-10-CM

## 2023-09-01 RX ORDER — LEVOTHYROXINE SODIUM 175 UG/1
175 TABLET ORAL
Qty: 90 TABLET | Refills: 0 | Status: SHIPPED | OUTPATIENT
Start: 2023-09-01

## 2023-09-28 DIAGNOSIS — E06.3 HYPOTHYROIDISM, ACQUIRED, AUTOIMMUNE: ICD-10-CM

## 2023-09-28 DIAGNOSIS — R79.89 ABNORMAL TSH: ICD-10-CM

## 2023-09-28 RX ORDER — LEVOTHYROXINE SODIUM 175 UG/1
175 TABLET ORAL
Qty: 90 TABLET | Refills: 0 | OUTPATIENT
Start: 2023-09-28

## 2023-10-28 DIAGNOSIS — J30.2 SEASONAL ALLERGIES: ICD-10-CM

## 2023-11-07 RX ORDER — MONTELUKAST SODIUM 10 MG/1
10 TABLET ORAL DAILY
Qty: 30 TABLET | Refills: 0 | Status: SHIPPED | OUTPATIENT
Start: 2023-11-07

## 2024-05-01 ENCOUNTER — OFFICE VISIT (OUTPATIENT)
Dept: ALLERGY | Facility: CLINIC | Age: 49
End: 2024-05-01
Payer: COMMERCIAL

## 2024-05-01 VITALS
BODY MASS INDEX: 26.05 KG/M2 | DIASTOLIC BLOOD PRESSURE: 79 MMHG | SYSTOLIC BLOOD PRESSURE: 123 MMHG | HEART RATE: 55 BPM | OXYGEN SATURATION: 99 % | WEIGHT: 182 LBS | HEIGHT: 70 IN

## 2024-05-01 DIAGNOSIS — J30.2 SEASONAL AND PERENNIAL ALLERGIC RHINOCONJUNCTIVITIS: Primary | ICD-10-CM

## 2024-05-01 DIAGNOSIS — Z91.018 FOOD ALLERGY: ICD-10-CM

## 2024-05-01 DIAGNOSIS — Z23 FLU VACCINE NEED: ICD-10-CM

## 2024-05-01 DIAGNOSIS — Z23 NEED FOR COVID-19 VACCINE: ICD-10-CM

## 2024-05-01 DIAGNOSIS — J30.89 SEASONAL AND PERENNIAL ALLERGIC RHINOCONJUNCTIVITIS: Primary | ICD-10-CM

## 2024-05-01 DIAGNOSIS — H10.10 SEASONAL AND PERENNIAL ALLERGIC RHINOCONJUNCTIVITIS: Primary | ICD-10-CM

## 2024-05-01 PROCEDURE — 3008F BODY MASS INDEX DOCD: CPT | Performed by: ALLERGY & IMMUNOLOGY

## 2024-05-01 PROCEDURE — 3078F DIAST BP <80 MM HG: CPT | Performed by: ALLERGY & IMMUNOLOGY

## 2024-05-01 PROCEDURE — 3074F SYST BP LT 130 MM HG: CPT | Performed by: ALLERGY & IMMUNOLOGY

## 2024-05-01 PROCEDURE — 99204 OFFICE O/P NEW MOD 45 MIN: CPT | Performed by: ALLERGY & IMMUNOLOGY

## 2024-05-01 RX ORDER — CETIRIZINE HYDROCHLORIDE 10 MG/1
10 TABLET ORAL DAILY
COMMUNITY
End: 2024-05-01

## 2024-05-01 RX ORDER — AZELASTINE HYDROCHLORIDE 0.5 MG/ML
1 SOLUTION/ DROPS OPHTHALMIC 2 TIMES DAILY
Qty: 1 EACH | Refills: 0 | Status: SHIPPED | OUTPATIENT
Start: 2024-05-01

## 2024-05-01 RX ORDER — LEVOCETIRIZINE DIHYDROCHLORIDE 5 MG/1
5 TABLET, FILM COATED ORAL 2 TIMES DAILY
Qty: 180 TABLET | Refills: 1 | Status: SHIPPED | OUTPATIENT
Start: 2024-05-01

## 2024-05-01 RX ORDER — AZELASTINE 1 MG/ML
2 SPRAY, METERED NASAL DAILY
Qty: 3 EACH | Refills: 0 | Status: SHIPPED | OUTPATIENT
Start: 2024-05-01

## 2024-05-01 NOTE — PROGRESS NOTES
Corinne Edith Preis is a 48 year old female.    HPI:     Chief Complaint   Patient presents with    Allergies     Patient presents for environmental allergies, states has had since a child.has nasal congestion with clear drainage, watery, itchy, burning eyes.took Zyrtec yesterday,24.    Food Allergy     Patient has a concern for a dairy allergy- has nausea, stomach cramps.    Has a reaction to macademia nuts- felt like sandpaper in throat.     48-year-old female who presents for allergy evaluation    Allergy medication list includes Singulair and EpiPen    Review of immunization records notes COVID-vaccine x 2 doses last in   No flu vaccine on record for the /winter 2023    Today patient reports    Allergies?   Duration: 40+  years   Timing: year + spring and    Symptoms: rn,  we, ie, we, puffy eyes , nc in fall   Severity: moderate   Status:worsening   Triggers: allergies   Tried: zyrtec , flonase (too drying and irritation),   Prior xyzal, allegra, claritin , singulair( side effects)   Pets : 1 dog   No prior ait     former smoker.    Hx of asthma, ad, or food allergy:    Tree nut (macadamia) : globus, flushing, sandpaper in throat   Has epipen, no prior usage   Food Allergy    Patient has a concern for a dairy allergy- has nausea, stomach cramps.    Has a reaction to macademia nuts- felt like sandpaper in throat.   Avoids peanut and tree nuts. Gi issues with peanut   Ok with oat milk     Works in grocery store         HISTORY:  Past Medical History:    Anesthesia complication    woke up in a dental procedure once    Anxiety state    Autism spectrum disorder (HCC)    Back problem    Disorder of thyroid    Hx of motion sickness    Migraines    Osteoarthritis    PONV (postoperative nausea and vomiting)    Visual impairment    glasses      Past Surgical History:   Procedure Laterality Date          x2    Endometrial ablation      [place] teeth removed        Family History   Problem  Relation Age of Onset    Allergies Father     Breast Cancer Mother 68    Allergies Mother     Breast Cancer Maternal Grandmother 30        1st age 30 age 60    Allergies Brother     Breast Cancer Maternal Aunt         post shaji      Social History:   Social History     Socioeconomic History    Marital status:    Tobacco Use    Smoking status: Former     Current packs/day: 0.00     Average packs/day: 0.5 packs/day for 5.0 years (2.5 ttl pk-yrs)     Types: Cigarettes     Start date:      Quit date:      Years since quittin.3     Passive exposure: Never    Smokeless tobacco: Never   Vaping Use    Vaping status: Never Used   Substance and Sexual Activity    Alcohol use: Yes     Alcohol/week: 1.0 standard drink of alcohol     Types: 1 Glasses of wine per week     Comment: occ- very seldom    Drug use: Never    Sexual activity: Yes     Partners: Male   Other Topics Concern    Caffeine Concern Yes     Comment: 5-6 cups a day    Special Diet No    Exercise Yes     Comment: walking        Medications (Active prior to today's visit):  Current Outpatient Medications   Medication Sig Dispense Refill    levocetirizine 5 MG Oral Tab Take 1 tablet (5 mg total) by mouth in the morning and 1 tablet (5 mg total) before bedtime. 180 tablet 1    azelastine 0.1 % Nasal Solution 2 sprays by Nasal route daily. 3 each 0    Azelastine HCl 0.05 % Ophthalmic Solution Place 1 drop into both eyes 2 (two) times daily. 1 each 0    LEVOTHYROXINE 175 MCG Oral Tab TAKE ONE TABLET BY MOUTH ONE TIME DAILY BEFORE BREAKFAST 90 tablet 0    gabapentin 400 MG Oral Cap Take 2 capsules (800 mg total) by mouth 4 (four) times daily. 2 pills in the morning. 2 pills at night.      Therapeutic Multivit/Mineral Oral Tab Take 1 tablet by mouth daily.      QUEtiapine 25 MG Oral Tab Take 2 tablets (50 mg total) by mouth nightly.      sertraline 100 MG Oral Tab Take 1.5 tablets (150 mg total) by mouth daily.      montelukast 10 MG Oral Tab TAKE  ONE TABLET BY MOUTH ONE TIME DAILY (Patient not taking: Reported on 5/1/2024) 30 tablet 0    EPINEPHrine 0.3 MG/0.3ML Injection Solution Auto-injector  (Patient not taking: Reported on 5/1/2024)         Allergies:  Allergies   Allergen Reactions    Other OTHER (SEE COMMENTS)     This patient has the blood group alloantibody anti-Jk(a).  Anti-Jk(a) can cause severe immediate and delayed hemolytic transfusion reactions, so the patient should always receive RBCs that lack the Jk(a) antigen (25% of donors), regardless of the future detectability of the antibody.  Two units of Jk(a) negative RBCs will automatically be crossmatched and reserved for the patient whenever a type-and-screen is ordered.  Ricky Padilla M.D., Director, Blood Bank     L[place]ov[place] NAUSEA AND VOMITING and OTHER (SEE COMMENTS)     stomach cramping    Milk NAUSEA AND VOMITING and OTHER (SEE COMMENTS)     stomach cramping  stomach cramping    Peanut-Containing Drug Products NAUSEA AND VOMITING and OTHER (SEE COMMENTS)     tree nuts  stomach cramping  tree nuts  stomach cramping    Tree Nuts NAUSEA AND VOMITING    Peanuts OTHER (SEE COMMENTS)    Tree Extract OTHER (SEE COMMENTS)         ROS:     Allergic/Immuno:  See HPI  Cardiovascular:  Negative for irregular heartbeat/palpitations, chest pain, edema  Constitutional:  Negative night sweats,weight loss, irritability and lethargy  Endocrine:  Negative for cold intolerance, polydipsia and polyphagia  ENMT:  Negative for ear drainage, hearing loss  see hpi   Eyes:  Negative for eye discharge and vision loss  Gastrointestinal: see hpi   Genitourinary:  Negative for dysuria and hematuria  Hema/Lymph:  Negative for easy bleeding and easy bruising  Integumentary:  Negative for pruritus and rash  Musculoskeletal:  Negative for joint symptoms  Neurological:  Negative for dizziness, seizures  Psychiatric:  Negative for inappropriate interaction and psychiatric symptoms  Respiratory:  Negative for cough,  dyspnea and wheezing      PHYSICAL EXAM:   Constitutional: responsive, no acute distress noted  Head/Face: NC/Atraumatic  Eyes/Vision: conjunctiva and lids are normal extraocular motion is intact   Ears/Audiometry: tympanic membranes are normal bilaterally hearing is grossly intact  Nose/Mouth/Throat: nose and throat are clear mucous membranes are moist   Neck/Thyroid: neck is supple without adenopathy  Lymphatic: no abnormal cervical, supraclavicular or axillary adenopathy is noted  Respiratory: normal to inspection lungs are clear to auscultation bilaterally normal respiratory effort   Cardiovascular: regular rate and rhythm no murmurs, gallups, or rubs  Abdomen: soft non-tender non-distended  Skin/Hair: no unusual rashes present  Extremities: no edema, cyanosis, or clubbing  Neurological:Oriented to time, place, person & situation       ASSESSMENT/PLAN:   Assessment   Encounter Diagnoses   Name Primary?    Seasonal and perennial allergic rhinoconjunctivitis Yes    Food allergy     Flu vaccine need     Need for COVID-19 vaccine      Unable to skin test today as patient is currently on Zyrtec/antihistamines    #1 allergic rhinitis  Reviewed avoidance measures and potential treatment option immunotherapy  Symptoms typically worse in the spring and fall.  Will bring patient back in for skin testing over the summer  The meantime we will try Xyzal, levocetirizine 5 mg once a day up to twice a day if needed  Trial of Astelin 2 sprays per nostril twice a day as an antihistamine nasal spray as needed  Trial of Optivar/azelastine eyedrops 1 drop per eye twice a day as an antihistamine eyedrop  Store eyedrops in fridge rater for cooling effect  Will avoid Singulair due to previous side effects in the past.    #2 Food allergies  Still avoiding peanuts and tree nuts.  Patient also concern for milk as potential trigger for adverse food symptoms.  Including GI issues.  Plan on skin testing to peanut tree nut panel as well as  milk  Reviewed potential lactose intolerance with milk but will screen for milk protein allergy with skin testing in the future.  Currently tolerating oat milk without issues  EpiPen and Benadryl as needed based on symptom severity event of allergic reaction    #3 flu vaccine recommended in the fall    #4 COVID-vaccine booster recommended.             Orders This Visit:  No orders of the defined types were placed in this encounter.      Meds This Visit:  Requested Prescriptions     Signed Prescriptions Disp Refills    levocetirizine 5 MG Oral Tab 180 tablet 1     Sig: Take 1 tablet (5 mg total) by mouth in the morning and 1 tablet (5 mg total) before bedtime.    azelastine 0.1 % Nasal Solution 3 each 0     Si sprays by Nasal route daily.    Azelastine HCl 0.05 % Ophthalmic Solution 1 each 0     Sig: Place 1 drop into both eyes 2 (two) times daily.       Imaging & Referrals:  None     2024  Marco Martinez MD      If medication samples were provided today, they were provided solely for patient education and training related to self administration of these medications.  Teaching, instruction and sample was provided to the patient by myself.  Teaching included  a review of potential adverse side effects as well as potential efficacy.  Patient's questions were answered in regards to medication administration and dosing and potential side effects. Teaching was provided via the teach back method

## 2024-05-01 NOTE — PATIENT INSTRUCTIONS
#1 allergic rhinitis  Reviewed avoidance measures and potential treatment option immunotherapy  Symptoms typically worse in the spring and fall.  Will bring patient back in for skin testing over the summer  The meantime we will try Xyzal, levocetirizine 5 mg once a day up to twice a day if needed  Trial of Astelin 2 sprays per nostril twice a day as an antihistamine nasal spray as needed  Trial of Optivar/azelastine eyedrops 1 drop per eye twice a day as an antihistamine eyedrop  Store eyedrops in fridge rater for cooling effect  Will avoid Singulair due to previous side effects in the past.    #2 Food allergies  Still avoiding peanuts and tree nuts.  Patient also concern for milk as potential trigger for adverse food symptoms.  Including GI issues.  Plan on skin testing to peanut tree nut panel as well as milk  Reviewed potential lactose intolerance with milk but will screen for milk protein allergy with skin testing in the future.  Currently tolerating oat milk without issues  EpiPen and Benadryl as needed based on symptom severity event of allergic reaction    #3 flu vaccine recommended in the fall    #4 COVID-vaccine booster recommended.             Orders This Visit:  No orders of the defined types were placed in this encounter.      Meds This Visit:  Requested Prescriptions     Signed Prescriptions Disp Refills    levocetirizine 5 MG Oral Tab 180 tablet 1     Sig: Take 1 tablet (5 mg total) by mouth in the morning and 1 tablet (5 mg total) before bedtime.    azelastine 0.1 % Nasal Solution 3 each 0     Si sprays by Nasal route daily.    Azelastine HCl 0.05 % Ophthalmic Solution 1 each 0     Sig: Place 1 drop into both eyes 2 (two) times daily.

## 2024-05-16 ENCOUNTER — OFFICE VISIT (OUTPATIENT)
Dept: FAMILY MEDICINE CLINIC | Facility: CLINIC | Age: 49
End: 2024-05-16

## 2024-05-16 ENCOUNTER — LAB ENCOUNTER (OUTPATIENT)
Dept: LAB | Age: 49
End: 2024-05-16
Attending: FAMILY MEDICINE

## 2024-05-16 VITALS
TEMPERATURE: 98 F | SYSTOLIC BLOOD PRESSURE: 118 MMHG | BODY MASS INDEX: 25.2 KG/M2 | WEIGHT: 176 LBS | HEART RATE: 62 BPM | DIASTOLIC BLOOD PRESSURE: 62 MMHG | RESPIRATION RATE: 18 BRPM | HEIGHT: 70 IN

## 2024-05-16 DIAGNOSIS — Z00.00 HEALTHCARE MAINTENANCE: Primary | ICD-10-CM

## 2024-05-16 DIAGNOSIS — Z12.11 SCREEN FOR COLON CANCER: ICD-10-CM

## 2024-05-16 DIAGNOSIS — N95.1 MENOPAUSAL VASOMOTOR SYNDROME: ICD-10-CM

## 2024-05-16 DIAGNOSIS — E06.3 HYPOTHYROIDISM, ACQUIRED, AUTOIMMUNE: ICD-10-CM

## 2024-05-16 DIAGNOSIS — Z00.00 HEALTHCARE MAINTENANCE: ICD-10-CM

## 2024-05-16 DIAGNOSIS — R92.1 BREAST CALCIFICATIONS: ICD-10-CM

## 2024-05-16 LAB
ALBUMIN SERPL-MCNC: 4.2 G/DL (ref 3.4–5)
ALBUMIN/GLOB SERPL: 1.4 {RATIO} (ref 1–2)
ALP LIVER SERPL-CCNC: 43 U/L
ALT SERPL-CCNC: 44 U/L
ANION GAP SERPL CALC-SCNC: 2 MMOL/L (ref 0–18)
AST SERPL-CCNC: 28 U/L (ref 15–37)
BASOPHILS # BLD AUTO: 0.05 X10(3) UL (ref 0–0.2)
BASOPHILS NFR BLD AUTO: 1 %
BILIRUB SERPL-MCNC: 0.5 MG/DL (ref 0.1–2)
BUN BLD-MCNC: 10 MG/DL (ref 9–23)
CALCIUM BLD-MCNC: 9 MG/DL (ref 8.5–10.1)
CHLORIDE SERPL-SCNC: 104 MMOL/L (ref 98–112)
CHOLEST SERPL-MCNC: 223 MG/DL (ref ?–200)
CO2 SERPL-SCNC: 32 MMOL/L (ref 21–32)
CREAT BLD-MCNC: 0.81 MG/DL
EGFRCR SERPLBLD CKD-EPI 2021: 91 ML/MIN/1.73M2 (ref 60–?)
EOSINOPHIL # BLD AUTO: 0.27 X10(3) UL (ref 0–0.7)
EOSINOPHIL NFR BLD AUTO: 5.4 %
ERYTHROCYTE [DISTWIDTH] IN BLOOD BY AUTOMATED COUNT: 13.2 %
FASTING PATIENT LIPID ANSWER: YES
FASTING STATUS PATIENT QL REPORTED: YES
FSH SERPL-ACNC: 4.5 MIU/ML
GLOBULIN PLAS-MCNC: 3.1 G/DL (ref 2.8–4.4)
GLUCOSE BLD-MCNC: 93 MG/DL (ref 70–99)
HCT VFR BLD AUTO: 37.6 %
HDLC SERPL-MCNC: 88 MG/DL (ref 40–59)
HGB BLD-MCNC: 12.6 G/DL
IMM GRANULOCYTES # BLD AUTO: 0.01 X10(3) UL (ref 0–1)
IMM GRANULOCYTES NFR BLD: 0.2 %
LDLC SERPL CALC-MCNC: 125 MG/DL (ref ?–100)
LH SERPL-ACNC: 6.4 MIU/ML
LYMPHOCYTES # BLD AUTO: 1.22 X10(3) UL (ref 1–4)
LYMPHOCYTES NFR BLD AUTO: 24.4 %
MCH RBC QN AUTO: 32.1 PG (ref 26–34)
MCHC RBC AUTO-ENTMCNC: 33.5 G/DL (ref 31–37)
MCV RBC AUTO: 95.7 FL
MONOCYTES # BLD AUTO: 0.3 X10(3) UL (ref 0.1–1)
MONOCYTES NFR BLD AUTO: 6 %
NEUTROPHILS # BLD AUTO: 3.15 X10 (3) UL (ref 1.5–7.7)
NEUTROPHILS # BLD AUTO: 3.15 X10(3) UL (ref 1.5–7.7)
NEUTROPHILS NFR BLD AUTO: 63 %
NONHDLC SERPL-MCNC: 135 MG/DL (ref ?–130)
OSMOLALITY SERPL CALC.SUM OF ELEC: 285 MOSM/KG (ref 275–295)
PLATELET # BLD AUTO: 203 10(3)UL (ref 150–450)
POTASSIUM SERPL-SCNC: 4.3 MMOL/L (ref 3.5–5.1)
PROLACTIN SERPL-MCNC: 18.3 NG/ML
PROT SERPL-MCNC: 7.3 G/DL (ref 6.4–8.2)
RBC # BLD AUTO: 3.93 X10(6)UL
SODIUM SERPL-SCNC: 138 MMOL/L (ref 136–145)
T4 FREE SERPL-MCNC: 0.4 NG/DL (ref 0.8–1.7)
TRIGL SERPL-MCNC: 55 MG/DL (ref 30–149)
TSI SER-ACNC: >100 MIU/ML (ref 0.36–3.74)
VLDLC SERPL CALC-MCNC: 10 MG/DL (ref 0–30)
WBC # BLD AUTO: 5 X10(3) UL (ref 4–11)

## 2024-05-16 PROCEDURE — 99396 PREV VISIT EST AGE 40-64: CPT | Performed by: FAMILY MEDICINE

## 2024-05-16 PROCEDURE — 3078F DIAST BP <80 MM HG: CPT | Performed by: FAMILY MEDICINE

## 2024-05-16 PROCEDURE — 3074F SYST BP LT 130 MM HG: CPT | Performed by: FAMILY MEDICINE

## 2024-05-16 PROCEDURE — 80050 GENERAL HEALTH PANEL: CPT | Performed by: FAMILY MEDICINE

## 2024-05-16 PROCEDURE — 83001 ASSAY OF GONADOTROPIN (FSH): CPT | Performed by: FAMILY MEDICINE

## 2024-05-16 PROCEDURE — 84146 ASSAY OF PROLACTIN: CPT | Performed by: FAMILY MEDICINE

## 2024-05-16 PROCEDURE — 3008F BODY MASS INDEX DOCD: CPT | Performed by: FAMILY MEDICINE

## 2024-05-16 PROCEDURE — 83002 ASSAY OF GONADOTROPIN (LH): CPT | Performed by: FAMILY MEDICINE

## 2024-05-16 PROCEDURE — 84439 ASSAY OF FREE THYROXINE: CPT | Performed by: FAMILY MEDICINE

## 2024-05-16 PROCEDURE — 80061 LIPID PANEL: CPT | Performed by: FAMILY MEDICINE

## 2024-05-16 NOTE — PROGRESS NOTES
[facility] Family Medicine Office Note  Chief Complaint:   Chief Complaint   Patient presents with    Physical       HPI:   This is a 48 year old female coming in for physical exam.  The patient states that she has been taking her medications as prescribed.  She states that she has been having some hot flashes as of late as well as fluctuations in her mood.  She states that she also has had some issues with sleeping.  She is wondering if she is going through perimenopause.  She does have a history of an endometrial ablation.  She does have hypothyroidism since anxiety she has been taking her medications as prescribed.       Past Medical History:    Anesthesia complication    woke up in a dental procedure once    Anxiety state    Autism spectrum disorder (HCC)    Back problem    Disorder of thyroid    Hx of motion sickness    Migraines    Osteoarthritis    PONV (postoperative nausea and vomiting)    Visual impairment    glasses     Past Surgical History:   Procedure Laterality Date          x2    Endometrial ablation      [place] teeth removed       Social History:  Social History     Socioeconomic History    Marital status:    Tobacco Use    Smoking status: Former     Current packs/day: 0.00     Average packs/day: 0.5 packs/day for 5.0 years (2.5 ttl pk-yrs)     Types: Cigarettes     Start date:      Quit date:      Years since quittin.3     Passive exposure: Never    Smokeless tobacco: Never   Vaping Use    Vaping status: Never Used   Substance and Sexual Activity    Alcohol use: Yes     Alcohol/week: 1.0 standard drink of alcohol     Types: 1 Glasses of wine per week     Comment: occ- very seldom    Drug use: Never    Sexual activity: Yes     Partners: Male   Other Topics Concern    Caffeine Concern Yes     Comment: 5-6 cups a day    Special Diet No    Exercise Yes     Comment: walking     Family History:  Family History   Problem Relation Age of Onset    Allergies Father      Breast Cancer Mother 68    Allergies Mother     Breast Cancer Maternal Grandmother 30        1st age 30 age 60    Allergies Brother     Breast Cancer Maternal Aunt         post shaji     Allergies:  Allergies   Allergen Reactions    Other OTHER (SEE COMMENTS)     This patient has the blood group alloantibody anti-Jk(a).  Anti-Jk(a) can cause severe immediate and delayed hemolytic transfusion reactions, so the patient should always receive RBCs that lack the Jk(a) antigen (25% of donors), regardless of the future detectability of the antibody.  Two units of Jk(a) negative RBCs will automatically be crossmatched and reserved for the patient whenever a type-and-screen is ordered.  Ricky Padilla M.D., Director, Blood Bank     Lac Bovis NAUSEA AND VOMITING and OTHER (SEE COMMENTS)     stomach cramping    Milk NAUSEA AND VOMITING and OTHER (SEE COMMENTS)     stomach cramping  stomach cramping    Peanut-Containing Drug Products NAUSEA AND VOMITING and OTHER (SEE COMMENTS)     tree nuts  stomach cramping  tree nuts  stomach cramping    Tree Nuts NAUSEA AND VOMITING    Peanuts OTHER (SEE COMMENTS)    Tree Extract OTHER (SEE COMMENTS)     Current Meds:  Current Outpatient Medications   Medication Sig Dispense Refill    levocetirizine 5 MG Oral Tab Take 1 tablet (5 mg total) by mouth in the morning and 1 tablet (5 mg total) before bedtime. 180 tablet 1    azelastine 0.1 % Nasal Solution 2 sprays by Nasal route daily. 3 each 0    LEVOTHYROXINE 175 MCG Oral Tab TAKE ONE TABLET BY MOUTH ONE TIME DAILY BEFORE BREAKFAST 90 tablet 0    EPINEPHrine 0.3 MG/0.3ML Injection Solution Auto-injector       gabapentin 400 MG Oral Cap Take 2 capsules (800 mg total) by mouth 4 (four) times daily. 2 pills in the morning. 2 pills at night.      Therapeutic Multivit/Mineral Oral Tab Take 1 tablet by mouth daily.      QUEtiapine 25 MG Oral Tab Take 2 tablets (50 mg total) by mouth nightly.      sertraline 100 MG Oral Tab Take 1.5 tablets (150  mg total) by mouth daily.      Azelastine HCl 0.05 % Ophthalmic Solution Place 1 drop into both eyes 2 (two) times daily. (Patient not taking: Reported on 5/16/2024) 1 each 0      Counseling given: Not Answered       REVIEW OF SYSTEMS:   Consitutional: No fevers, chills, sweats  Eye: No recent visual problems  ENMT: No ear pain nasal congestion sore throat  Respiratory: No shortness of breath, cough  Cardiovascular: No chest pain palpitations syncope  Gastrointestinal: No nausea vomiting diarrhea  Genitourinary: No hematuria  Hema/Lymph no bruising tendency, swollen lymph glands  Endocrine: Negative for excessive thirst excessive hunger  Musculoskeletal: No back pain neck pain joint pain muscle pain decreased range of motion  Integumentary: No rash, pruritus, abrasions  Neurologic: Alert and oriented x4  Psychiatric: No anxiety, depression    Medical, surgical, family, and social histories were reviewed      EXAM:   VITALS:   Vitals:    05/16/24 1005   BP: 118/62   Pulse: 62   Resp: 18   Temp: 97.5 °F (36.4 °C)      GENERAL: well developed, well nourished, in no apparent distress  SKIN: no rashes, no suspicious lesions: Cool and Dry  HEENT: atraumatic, normocephalic, ears and throat are clear    Ears: TM's clear and visible bilaterally, no excess cerumen or erythema.   EYES: Pupils equal round and reactive.  Extraocular motions intact no scleral icterus no injection or drainage  THROAT without erythema tonsillar hypertrophy or exudate.  Uvula midline airway patent  NECK: Given midline.  No JVD or lymphadenopathy supple nontender no meningeal signs   LUNGS: clear to auscultation sounds equal bilaterally no wheezes rales or rhonchi  CARDIO: Regular rate and rhythm without murmurs gallops or rubs  GI: Soft nontender nondistended no hepatomegaly palpable masses.  No guarding.   without deformity or crepitus no flank tenderness  EXTREMITIES: no cyanosis, clubbing or edema no joint tenderness effusion or edema  noted.  No calf tenderness negative Homans' sign bilaterally  NEURO: Awake and alert.  Cranial nerves II through XII intact motor and sensory grossly within normal limits.  5 out of 5 muscle strength in all muscle groups.  Normal speech.  PSYCHIATRIC: Awake, alert and oriented x3, cooperative appropriate mood and affect.  Judgment intact       ASSESSMENT AND PLAN:   1. Healthcare maintenance  - Lipid Panel; Future  I did discuss with the patient at this time would be time to update her blood work she need a CBC CMP free T4 free T3 TSH.  Also be updating a mammogram she was asked to have this completed.  As well as a Cologuard.  She was asked to follow-up yearly for regular physical exams or for any other acute concern.  2. Menopausal vasomotor syndrome  - FSH AND LH [7137] [Q]; Future  - Prolactin [E]; Future  I did discuss with the patient she could be going through perimenopause at this time we will be ordering an FSH LH as well as prolactin level.  3. Hypothyroidism, acquired, autoimmune  - CBC With Differential With Platelet; Future  - Comp Metabolic Panel (14); Future  - TSH W Reflex To Free T4; Future  I did discuss with the patient to continue with her current Synthroid dose we will be updating a TSH T3-T4.  4. Breast calcifications  - [facility] SISSY 2D+3D DIAGNOSTIC [place] RIGHT (CPT=77065/29248)    5. Screen for colon cancer  - COLOGUARD COLON CANCER SCREENING (EXTERNAL)       Meds & Refills for this Visit:  Requested Prescriptions      No prescriptions requested or ordered in this encounter       Health Maintenance:  Health Maintenance Due   Topic Date Due    Colorectal Cancer Screening  Never done    COVID-19 Vaccine (3 - 2023-24 season) 09/01/2023    Annual Depression Screening  Never done    Annual Physical  05/17/2024    Mammogram  07/19/2024       Patient/Caregiver Education: Patient/Caregiver Education: There are no barriers to learning. Medical education done.   Outcome: Patient verbalizes understanding.  Patient is notified to call with any questions, complications, allergies, or worsening or changing symptoms.  Patient is to call with any side effects or complications from the treatments as a result of today.     Problem List:  Patient Active Problem List   Diagnosis    Anxiety    Hypothyroidism, acquired, autoimmune    Skin abnormality         No follow-ups on file.     Margarito Watt MD    Please note that portions of this note may have been completed with a voice recognition program. Efforts were made to edit the dictations but occasionally words are mis-transcribed.

## 2024-06-06 ENCOUNTER — NURSE ONLY (OUTPATIENT)
Dept: ALLERGY | Facility: CLINIC | Age: 49
End: 2024-06-06

## 2024-06-06 ENCOUNTER — OFFICE VISIT (OUTPATIENT)
Dept: ALLERGY | Facility: CLINIC | Age: 49
End: 2024-06-06
Payer: COMMERCIAL

## 2024-06-06 DIAGNOSIS — J30.2 SEASONAL AND PERENNIAL ALLERGIC RHINOCONJUNCTIVITIS: Primary | ICD-10-CM

## 2024-06-06 DIAGNOSIS — H10.10 SEASONAL AND PERENNIAL ALLERGIC RHINOCONJUNCTIVITIS: Primary | ICD-10-CM

## 2024-06-06 DIAGNOSIS — Z91.018 FOOD ALLERGY: ICD-10-CM

## 2024-06-06 DIAGNOSIS — Z23 NEED FOR COVID-19 VACCINE: ICD-10-CM

## 2024-06-06 DIAGNOSIS — Z23 FLU VACCINE NEED: ICD-10-CM

## 2024-06-06 DIAGNOSIS — J30.89 SEASONAL AND PERENNIAL ALLERGIC RHINOCONJUNCTIVITIS: Primary | ICD-10-CM

## 2024-06-06 PROCEDURE — 99214 OFFICE O/P EST MOD 30 MIN: CPT | Performed by: ALLERGY & IMMUNOLOGY

## 2024-06-06 PROCEDURE — 95004 PERQ TESTS W/ALRGNC XTRCS: CPT | Performed by: ALLERGY & IMMUNOLOGY

## 2024-06-06 PROCEDURE — 95024 IQ TESTS W/ALLERGENIC XTRCS: CPT | Performed by: ALLERGY & IMMUNOLOGY

## 2024-06-06 NOTE — PATIENT INSTRUCTIONS
#1 allergic rhinitis  Improved with Xyzal and Astelin  Not needing Optivar.  See above skin testing to screen for allergic triggers  Reviewed avoidance measures and potential treatment option immunotherapy  Consider Singulair if refractory      #2 Food allergy  See above skin testing to tree nuts.  If testing is negative may consider oral challenge to further evaluate.    #3 flu vaccine recommended in the fall      #4 COVID-vaccine booster recommended.  Reviewed I do not have the booster my office.  Please check with local pharmacy

## 2024-06-06 NOTE — PROGRESS NOTES
Patient is a 48-year-old female Corinne Edith Preis is a 48 year old female.    HPI:     Chief Complaint   Patient presents with    Allergies     Pt here for skin testing for seasonal and environmental allergies. Denies taking any antihistamines in the last 5 days.    Food Allergy     Pt here for possible food allergy testing for tree nuts and dairy. 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 Denies taking any antihistamines for past 5 days.      Patient is a 48-year-old female who presents for follow-up.  Patient last seen by me on May 1, 2024.  History of allergic rhinitis and tree nut allergy.  No skin testing at last visit as patient was on antihistamines  Patient presents for skin testing today to screen for allergic triggers    No significant changes in the interim    No accidental ingestions ED visits or EpiPen uses in the interim    Medication list include Xyzal Astelin Optivar EpiPen COVID-vaccine x 2 doses last in   No flu vaccine on record for the fall/winter of this past year    Ar: meds helping xyzal astelin   Optivar  prn   40% better         HISTORY:  Past Medical History:    Anesthesia complication    woke up in a dental procedure once    Anxiety state    Autism spectrum disorder (HCC)    Back problem    Disorder of thyroid    Hx of motion sickness    Migraines    Osteoarthritis    PONV (postoperative nausea and vomiting)    Visual impairment    glasses      Past Surgical History:   Procedure Laterality Date          x2    Endometrial ablation      [place] teeth removed        Family History   Problem Relation Age of Onset    Allergies Father     Breast Cancer Mother 68    Allergies Mother     Breast Cancer Maternal Grandmother 30        1st age 30 age 60    Allergies Brother     Breast Cancer Maternal Aunt         post shaji      Social History:   Social History     Socioeconomic History    Marital status:    Tobacco Use    Smoking status: Former     Current packs/day: 0.00     Average  packs/day: 0.5 packs/day for 5.0 years (2.5 ttl pk-yrs)     Types: Cigarettes     Start date:      Quit date:      Years since quittin.4     Passive exposure: Never    Smokeless tobacco: Never   Vaping Use    Vaping status: Never Used   Substance and Sexual Activity    Alcohol use: Yes     Alcohol/week: 1.0 standard drink of alcohol     Types: 1 Glasses of wine per week     Comment: occ- very seldom    Drug use: Never    Sexual activity: Yes     Partners: Male   Other Topics Concern    Caffeine Concern Yes     Comment: 5-6 cups a day    Special Diet No    Exercise Yes     Comment: walking        Medications (Active prior to today's visit):  Current Outpatient Medications   Medication Sig Dispense Refill    levocetirizine 5 MG Oral Tab Take 1 tablet (5 mg total) by mouth in the morning and 1 tablet (5 mg total) before bedtime. 180 tablet 1    azelastine 0.1 % Nasal Solution 2 sprays by Nasal route daily. 3 each 0    LEVOTHYROXINE 175 MCG Oral Tab TAKE ONE TABLET BY MOUTH ONE TIME DAILY BEFORE BREAKFAST 90 tablet 0    EPINEPHrine 0.3 MG/0.3ML Injection Solution Auto-injector       gabapentin 400 MG Oral Cap Take 2 capsules (800 mg total) by mouth 4 (four) times daily. 2 pills in the morning. 2 pills at night.      Therapeutic Multivit/Mineral Oral Tab Take 1 tablet by mouth daily.      QUEtiapine 25 MG Oral Tab Take 2 tablets (50 mg total) by mouth nightly.      sertraline 100 MG Oral Tab Take 1.5 tablets (150 mg total) by mouth daily.      Azelastine HCl 0.05 % Ophthalmic Solution Place 1 drop into both eyes 2 (two) times daily. (Patient not taking: Reported on 2024) 1 each 0       Allergies:  Allergies   Allergen Reactions    Other OTHER (SEE COMMENTS)     This patient has the blood group alloantibody anti-Jk(a).  Anti-Jk(a) can cause severe immediate and delayed hemolytic transfusion reactions, so the patient should always receive RBCs that lack the Jk(a) antigen (25% of donors), regardless of the  future detectability of the antibody.  Two units of Jk(a) negative RBCs will automatically be crossmatched and reserved for the patient whenever a type-and-screen is ordered.  Ricky Padilla M.D., Director, Blood Bank     Lac Bovis NAUSEA AND VOMITING and OTHER (SEE COMMENTS)     stomach cramping    Milk NAUSEA AND VOMITING and OTHER (SEE COMMENTS)     stomach cramping  stomach cramping    Peanut-Containing Drug Products NAUSEA AND VOMITING and OTHER (SEE COMMENTS)     tree nuts  stomach cramping  tree nuts  stomach cramping    Tree Nuts NAUSEA AND VOMITING    Peanuts OTHER (SEE COMMENTS)    Tree Extract OTHER (SEE COMMENTS)         ROS:   Allergic/Immuno:  See hpi  Cardiovascular:  Negative for irregular heartbeat/palpitations, chest pain, edema  Constitutional:  Negative night sweats,weight loss, irritability and lethargy  ENMT:  Negative for ear drainage, hearing loss and nasal drainage  Eyes:  Negative for eye discharge and vision loss  Gastrointestinal:  Negative for abdominal pain, diarrhea and vomiting  Integumentary:  Negative for pruritus and rash  Respiratory:  Negative for cough, dyspnea and wheezing    PHYSICAL EXAM:   Constitutional: responsive, no acute distress noted  Head/Face: NC/Atraumatic  Eyes/Vision: conjunctiva and lids are normal extraocular motion is intact   Ears/Audiometry: tympanic membranes are normal bilaterally hearing is grossly intact  Nose/Mouth/Throat: nose and throat are clear mucous membranes are moist   Neck/Thyroid: neck is supple without adenopathy  Lymphatic: no abnormal cervical, supraclavicular or axillary adenopathy is noted  Respiratory: normal to inspection lungs are clear to auscultation bilaterally normal respiratory effort   Cardiovascular: regular rate and rhythm no murmurs, gallups, or rubs  Abdomen: soft non-tender non-distended  Skin/Hair: no unusual rashes present   Extremities: no edema, cyanosis, or clubbing     ASSESSMENT/PLAN:   Assessment   Encounter  Diagnoses   Name Primary?    Seasonal and perennial allergic rhinoconjunctivitis Yes    Food allergy     Flu vaccine need     Need for COVID-19 vaccine        Skin testing today to, indoor and outdoor environmental allergies was positive to ragweed and dust mite    Skin testing today to tree nut panel including almonds hazelnut cashew pistachio pecan walnuts and Brazil nut was negative    Positive histamine control    #1 allergic rhinitis  Improved with Xyzal and Astelin  Not needing Optivar.  See above skin testing to screen for allergic triggers  Reviewed avoidance measures and potential treatment option immunotherapy  Consider Singulair if refractory      #2 Food allergy  See above skin testing to tree nuts.  If testing is negative may consider oral challenge to further evaluate.    #3 flu vaccine recommended in the fall      #4 COVID-vaccine booster recommended.  Reviewed I do not have the booster my office.  Please check with local pharmacy           Orders This Visit:  No orders of the defined types were placed in this encounter.      Meds This Visit:  Requested Prescriptions      No prescriptions requested or ordered in this encounter       Imaging & Referrals:  None     6/6/2024  Marco Martinez MD    If medication samples were provided today, they were provided solely for patient education and training related to self administration of these medications.  Teaching, instruction and sample was provided to the patient by myself.  Teaching included  a review of potential adverse side effects as well as potential efficacy.  Patient's questions were answered in regards to medication administration and dosing and potential side effects. Teaching was provided via the teach back method

## 2024-09-09 ENCOUNTER — HOSPITAL ENCOUNTER (OUTPATIENT)
Age: 49
Discharge: HOME | End: 2024-09-09
Payer: COMMERCIAL

## 2024-09-09 ENCOUNTER — APPOINTMENT (OUTPATIENT)
Dept: GENERAL RADIOLOGY | Age: 49
End: 2024-09-09
Attending: NURSE PRACTITIONER
Payer: COMMERCIAL

## 2024-09-09 VITALS
SYSTOLIC BLOOD PRESSURE: 119 MMHG | DIASTOLIC BLOOD PRESSURE: 66 MMHG | TEMPERATURE: 99 F | RESPIRATION RATE: 18 BRPM | OXYGEN SATURATION: 99 % | HEART RATE: 58 BPM

## 2024-09-09 DIAGNOSIS — S60.221A CONTUSION OF RIGHT HAND, INITIAL ENCOUNTER: Primary | ICD-10-CM

## 2024-09-09 PROCEDURE — 99214 OFFICE O/P EST MOD 30 MIN: CPT

## 2024-09-09 PROCEDURE — 73130 X-RAY EXAM OF HAND: CPT | Performed by: NURSE PRACTITIONER

## 2024-09-09 PROCEDURE — 99213 OFFICE O/P EST LOW 20 MIN: CPT

## 2024-09-09 RX ORDER — IBUPROFEN 600 MG/1
600 TABLET, FILM COATED ORAL ONCE
Status: COMPLETED | OUTPATIENT
Start: 2024-09-09 | End: 2024-09-09

## 2024-09-09 NOTE — ED PROVIDER NOTES
Patient Seen in: [facility]      History     Chief Complaint   Patient presents with    Arm or Hand Injury     Entered by patient     Stated Complaint: Arm or Hand Injury    Subjective:   HPI  48-year-old with anxiety, autism, osteoarthritis, and migraines who is right-handed presents complaining of a trip and fall yesterday with right hand swelling and pain.  She landed on her knees but denies any issues with her knees.  She states her tetanus is up-to-date.  She took Tylenol this morning with no pain meds since.  She went to work today and left at noon.       Objective:   Past Medical History:    Anesthesia complication    woke up in a dental procedure once    Anxiety state    Autism spectrum disorder (HCC)    Back problem    Disorder of thyroid    Hx of motion sickness    Migraines    Osteoarthritis    PONV (postoperative nausea and vomiting)    Visual impairment    glasses              Past Surgical History:   Procedure Laterality Date          x2    Endometrial ablation      [place] teeth removed                  Social History     Socioeconomic History    Marital status:    Tobacco Use    Smoking status: Former     Current packs/day: 0.00     Average packs/day: 0.5 packs/day for 5.0 years (2.5 ttl pk-yrs)     Types: Cigarettes     Start date:      Quit date: 2004     Years since quittin.7     Passive exposure: Never    Smokeless tobacco: Never   Vaping Use    Vaping status: Never Used   Substance and Sexual Activity    Alcohol use: Yes     Alcohol/week: 1.0 standard drink of alcohol     Types: 1 Glasses of wine per week     Comment: occ- very seldom    Drug use: Never    Sexual activity: Yes     Partners: Male   Other Topics Concern    Caffeine Concern Yes     Comment: 5-6 cups a day    Special Diet No    Exercise Yes     Comment: walking              Review of Systems   All other systems reviewed and are negative.      Positive for stated Chief Complaint: Arm or Hand  Injury (Entered by patient)    Other systems are as noted in HPI.  Constitutional and vital signs reviewed.      All other systems reviewed and negative except as noted above.    Physical Exam     ED Triage Vitals [09/09/24 1339]   /66   Pulse 58   Resp 18   Temp 99 °F (37.2 °C)   Temp src Temporal   SpO2 99 %   O2 Device None (Room air)       Current Vitals:   Vital Signs  BP: 119/66  Pulse: 58  Resp: 18  Temp: 99 °F (37.2 °C)  Temp src: Temporal    Oxygen Therapy  SpO2: 99 %  O2 Device: None (Room air)            Physical Exam  Vitals and nursing note reviewed.   Constitutional:       Appearance: She is well-developed.   Cardiovascular:      Rate and Rhythm: Normal rate.   Pulmonary:      Effort: Pulmonary effort is normal.   Musculoskeletal:      Comments: Swelling and tenderness to the dorsal aspect of the right hand primarily over the 3rd through 5th metacarpals.  Full range of motion of the digits.  Full range of motion of the wrist.  Radial pulse 3+.   Skin:     General: Skin is warm and dry.   Neurological:      Mental Status: She is alert and oriented to person, place, and time.               ED Course   Labs Reviewed - No data to display          XR HAND (MIN 3 VIEWS), RIGHT (CPT=73130)    Result Date: 9/9/2024  PROCEDURE:  XR HAND (MIN 3 VIEWS), RIGHT (CPT=73130)  TECHNIQUE:  Three views of the right hand were obtained.  COMPARISON:  None.  INDICATIONS:  Arm or Hand Injury  PATIENT STATED HISTORY: (As transcribed by Technologist)  Right hand pain metacarpal area, post fall.    FINDINGS:  There is no fracture, dislocation, or subluxation.  There is no lytic or blastic lesions.  The soft tissues are unremarkable.  The alignment of the bones is within normal limits.            CONCLUSION:  No acute disease.    LOCATION:  [facility]   Dictated by (CST): Justin Palacios MD on 9/09/2024 at 2:30 PM     Finalized by (CST): Justin Palacios MD on 9/09/2024 at 2:31 PM             [facility]     Medical Decision  Making  48-year-old with anxiety, autism, osteoarthritis, and migraines who is right-handed presents complaining of a trip and fall yesterday with right hand swelling and pain.  She landed on her knees but denies any issues with her knees.  She states her tetanus is up-to-date.  She took Tylenol this morning with no pain meds since.  She went to work today and left at noon.     Patient coming in with hand injury.   Differential diagnosis includes but not limited to hand injury, hand contusion, hand fracture, hand sprain  Will treat for hand contusion.  Will discharge on Tylenol and Motrin as needed for pain and close follow-up with orthopedics. Patient/Parent is comfortable with this plan.    Velcro wrist splint was offered and patient reported tenderness that made her pain worse.  Ace wrap was then offered and patient reported that these never work for her.  Patient instructed on follow-up with orthopedic for further evaluation due to the swelling, tenderness, and bruising.  Patient given a work note for light duty until follow-up with orthopedics.  Ice, elevation, and Tylenol/Motrin for pain.    Problems Addressed:  Contusion of right hand, initial encounter: acute illness or injury    Amount and/or Complexity of Data Reviewed  Radiology: ordered and independent interpretation performed.     Details: Hand x-ray ordered and independently interpreted by myself is no fracture        Disposition and Plan     Clinical Impression:  1. Contusion of right hand, initial encounter         Disposition:  Discharge  9/9/2024  2:41 pm    Follow-up:  Ritu Noriega PA  [address] [place] [id number] [phone]    Call in 1 week  As needed          Medications Prescribed:  Current Discharge Medication List

## 2024-09-09 NOTE — ED INITIAL ASSESSMENT (HPI)
C/o tripped and fell yesterday, right arm landed the cement ground. Pain and swelling right hand. Denies hitting head

## 2025-01-07 ENCOUNTER — OFFICE VISIT (OUTPATIENT)
Dept: FAMILY MEDICINE CLINIC | Facility: CLINIC | Age: 50
End: 2025-01-07
Payer: COMMERCIAL

## 2025-01-07 VITALS
SYSTOLIC BLOOD PRESSURE: 102 MMHG | BODY MASS INDEX: 24.48 KG/M2 | HEIGHT: 70 IN | RESPIRATION RATE: 16 BRPM | TEMPERATURE: 98 F | HEART RATE: 53 BPM | WEIGHT: 171 LBS | DIASTOLIC BLOOD PRESSURE: 60 MMHG

## 2025-01-07 DIAGNOSIS — L30.9 ECZEMA, UNSPECIFIED TYPE: Primary | ICD-10-CM

## 2025-01-07 PROCEDURE — 99214 OFFICE O/P EST MOD 30 MIN: CPT | Performed by: FAMILY MEDICINE

## 2025-01-07 PROCEDURE — 3078F DIAST BP <80 MM HG: CPT | Performed by: FAMILY MEDICINE

## 2025-01-07 PROCEDURE — 3008F BODY MASS INDEX DOCD: CPT | Performed by: FAMILY MEDICINE

## 2025-01-07 PROCEDURE — 3074F SYST BP LT 130 MM HG: CPT | Performed by: FAMILY MEDICINE

## 2025-01-07 RX ORDER — TRIAMCINOLONE ACETONIDE 1 MG/G
CREAM TOPICAL 2 TIMES DAILY PRN
Qty: 30 G | Refills: 0 | Status: SHIPPED | OUTPATIENT
Start: 2025-01-07

## 2025-01-07 RX ORDER — AZELASTINE 1 MG/ML
SPRAY, METERED NASAL 2 TIMES DAILY
COMMUNITY

## 2025-01-07 NOTE — PROGRESS NOTES
[facility] Family Medicine Office Note  Chief Complaint:   Chief Complaint   Patient presents with    Rash     Pt has a rash on her lower arms and shins for about 3 months.    Redness       HPI:   This is a 49 year old female coming in for rash of her upper extremities lower extremities the patient states that this started about 3 months ago states that the areas are very itchy and painful at times.  States that she does have a history of dry skin      Past Medical History:    Anesthesia complication    woke up in a dental procedure once    Anxiety state    Autism spectrum disorder (HCC)    Back problem    Disorder of thyroid    Hx of motion sickness    Migraines    Osteoarthritis    PONV (postoperative nausea and vomiting)    Visual impairment    glasses     Past Surgical History:   Procedure Laterality Date          x2    Endometrial ablation      [place] teeth removed       Social History:  Social History     Socioeconomic History    Marital status:    Tobacco Use    Smoking status: Former     Current packs/day: 0.00     Average packs/day: 0.5 packs/day for 5.0 years (2.5 ttl pk-yrs)     Types: Cigarettes     Start date:      Quit date:      Years since quittin.0     Passive exposure: Never    Smokeless tobacco: Never   Vaping Use    Vaping status: Never Used   Substance and Sexual Activity    Alcohol use: Yes     Alcohol/week: 1.0 standard drink of alcohol     Types: 1 Glasses of wine per week     Comment: occ- very seldom    Drug use: Never    Sexual activity: Yes     Partners: Male   Other Topics Concern    Caffeine Concern Yes     Comment: 5-6 cups a day    Special Diet No    Exercise Yes     Comment: walking     Family History:  Family History   Problem Relation Age of Onset    Allergies Father     Breast Cancer Mother 68    Allergies Mother     Breast Cancer Maternal Grandmother 30        1st age 30 age 60    Allergies Brother     Breast Cancer Maternal Aunt          post shaji     Allergies:  Allergies[1]  Current Meds:  Current Outpatient Medications   Medication Sig Dispense Refill    azelastine 0.1 % Nasal Solution by Nasal route 2 (two) times daily.      triamcinolone 0.1 % External Cream Apply topically 2 (two) times daily as needed. 30 g 0    levocetirizine 5 MG Oral Tab Take 1 tablet (5 mg total) by mouth in the morning and 1 tablet (5 mg total) before bedtime. 180 tablet 1    LEVOTHYROXINE 175 MCG Oral Tab TAKE ONE TABLET BY MOUTH ONE TIME DAILY BEFORE BREAKFAST 90 tablet 0    gabapentin 400 MG Oral Cap Take 2 capsules (800 mg total) by mouth 4 (four) times daily. 2 pills in the morning. 2 pills at night.      Therapeutic Multivit/Mineral Oral Tab Take 1 tablet by mouth daily.      QUEtiapine 25 MG Oral Tab Take 2 tablets (50 mg total) by mouth nightly.      sertraline 100 MG Oral Tab Take 1.5 tablets (150 mg total) by mouth daily.      EPINEPHrine 0.3 MG/0.3ML Injection Solution Auto-injector         Counseling given: Not Answered       REVIEW OF SYSTEMS:   Consitutional: No fevers, chills, sweats  Eye: No recent visual problems  ENMT: No ear pain nasal congestion sore throat  Respiratory: No shortness of breath, cough  Cardiovascular: No chest pain palpitations syncope  Gastrointestinal: No nausea vomiting diarrhea  Genitourinary: No hematuria  Hema/Lymph no bruising tendency, swollen lymph glands  Endocrine: Negative for excessive thirst excessive hunger  Musculoskeletal: No back pain neck pain joint pain muscle pain decreased range of motion  Integumentary positive rash, pruritus, no abrasions       Medical, surgical, family, and social histories were reviewed      EXAM:   VITALS:   Vitals:    01/07/25 1051   BP: 102/60   Pulse: 53   Resp: 16   Temp: 97.8 °F (36.6 °C)      GENERAL: well developed, well nourished, in no apparent distress  SKIN: no rashes, no suspicious lesions: Cool and Dry  HEENT: atraumatic, normocephalic, ears and throat are clear    Ears: TM's  clear and visible bilaterally, no excess cerumen or erythema.   EYES: Pupils equal round and reactive.  Extraocular motions intact no scleral icterus no injection or drainage  THROAT without erythema tonsillar hypertrophy or exudate.  Uvula midline airway patent  NECK: Given midline.  No JVD or lymphadenopathy supple nontender no meningeal signs   LUNGS: clear to auscultation sounds equal bilaterally no wheezes rales or rhonchi  CARDIO: Regular rate and rhythm without murmurs gallops or rubs  Skin there is some erythema present of the upper and lower extremities as well as some dry patches of skin    ASSESSMENT AND PLAN:   1. Eczema, unspecified type  - Derm Referral - In Network  - triamcinolone 0.1 % External Cream; Apply topically 2 (two) times daily as needed.  Dispense: 30 g; Refill: 0  I did discuss with the patient at this time would suggest for her to use triamcinolone as needed as well as moisturizing lotion.  Will be referring her to dermatology for further recommendations.    Meds & Refills for this Visit:  Requested Prescriptions     Signed Prescriptions Disp Refills    triamcinolone 0.1 % External Cream 30 g 0     Sig: Apply topically 2 (two) times daily as needed.       Health Maintenance:  Health Maintenance Due   Topic Date Due    Colorectal Cancer Screening  Never done    Mammogram  07/19/2024    COVID-19 Vaccine (3 - 2024-25 season) 09/01/2024    Influenza Vaccine (1) 10/01/2024    Annual Depression Screening  01/01/2025       Patient/Caregiver Education: Patient/Caregiver Education: There are no barriers to learning. Medical education done.   Outcome: Patient verbalizes understanding. Patient is notified to call with any questions, complications, allergies, or worsening or changing symptoms.  Patient is to call with any side effects or complications from the treatments as a result of today.     Problem List:  Patient Active Problem List   Diagnosis    Anxiety    Hypothyroidism, acquired,  autoimmune    Skin abnormality         No follow-ups on file.     Margarito Watt MD    Please note that portions of this note may have been completed with a voice recognition program. Efforts were made to edit the dictations but occasionally words are mis-transcribed.         [1]   Allergies  Allergen Reactions    Other OTHER (SEE COMMENTS)     This patient has the blood group alloantibody anti-Jk(a).  Anti-Jk(a) can cause severe immediate and delayed hemolytic transfusion reactions, so the patient should always receive RBCs that lack the Jk(a) antigen (25% of donors), regardless of the future detectability of the antibody.  Two units of Jk(a) negative RBCs will automatically be crossmatched and reserved for the patient whenever a type-and-screen is ordered.  Ricky Padilla M.D., Director, Blood Bank     [place] Oil NAUSEA AND VOMITING    [place] NAUSEA AND VOMITING and OTHER (SEE COMMENTS)     stomach cramping    Milk NAUSEA AND VOMITING and OTHER (SEE COMMENTS)     stomach cramping    stomach cramping      stomach cramping stomach cramping    Peanut-Containing Drug Products NAUSEA AND VOMITING and OTHER (SEE COMMENTS)     tree nuts  stomach cramping  tree nuts  stomach cramping    Tree Nuts NAUSEA AND VOMITING    Peanuts OTHER (SEE COMMENTS)    Tree Extract OTHER (SEE COMMENTS)

## 2025-02-04 ENCOUNTER — APPOINTMENT (OUTPATIENT)
Dept: GENERAL RADIOLOGY | Age: 50
End: 2025-02-04
Attending: NURSE PRACTITIONER
Payer: COMMERCIAL

## 2025-02-04 ENCOUNTER — HOSPITAL ENCOUNTER (OUTPATIENT)
Age: 50
Discharge: HOME | End: 2025-02-04
Payer: COMMERCIAL

## 2025-02-04 VITALS
SYSTOLIC BLOOD PRESSURE: 120 MMHG | HEART RATE: 63 BPM | TEMPERATURE: 99 F | DIASTOLIC BLOOD PRESSURE: 73 MMHG | OXYGEN SATURATION: 98 % | RESPIRATION RATE: 18 BRPM

## 2025-02-04 DIAGNOSIS — J06.9 UPPER RESPIRATORY TRACT INFECTION, UNSPECIFIED TYPE: ICD-10-CM

## 2025-02-04 DIAGNOSIS — R05.8 PRODUCTIVE COUGH: Primary | ICD-10-CM

## 2025-02-04 LAB — S PYO AG THROAT QL: NEGATIVE

## 2025-02-04 PROCEDURE — 71046 X-RAY EXAM CHEST 2 VIEWS: CPT | Performed by: NURSE PRACTITIONER

## 2025-02-04 PROCEDURE — 99214 OFFICE O/P EST MOD 30 MIN: CPT | Performed by: NURSE PRACTITIONER

## 2025-02-04 PROCEDURE — 87880 STREP A ASSAY W/OPTIC: CPT | Performed by: NURSE PRACTITIONER

## 2025-02-04 RX ORDER — BENZONATATE 100 MG/1
100 CAPSULE ORAL 3 TIMES DAILY PRN
Qty: 14 CAPSULE | Refills: 0 | Status: SHIPPED | OUTPATIENT
Start: 2025-02-04

## 2025-02-04 RX ORDER — ALBUTEROL SULFATE 90 UG/1
2 INHALANT RESPIRATORY (INHALATION) EVERY 4 HOURS PRN
Qty: 1 EACH | Refills: 0 | Status: SHIPPED | OUTPATIENT
Start: 2025-02-04 | End: 2025-03-06

## 2025-02-04 NOTE — ED PROVIDER NOTES
Patient Seen in: [facility]      History     Chief Complaint   Patient presents with    Cough/URI    Sore Throat     Stated Complaint: Cough - Really deep cough, sore throat and congestion    Subjective:   HPI    49-year-old female presents with 3 days of cough.  Started as a sore throat.  Negative COVID test x 2.  Cough is deep in nature.  No chest pain or shortness of breath.  Positive sick contact at home      Objective:     Past Medical History:    Anesthesia complication    woke up in a dental procedure once    Anxiety state    Autism spectrum disorder (HCC)    Back problem    Disorder of thyroid    Hx of motion sickness    Migraines    Osteoarthritis    PONV (postoperative nausea and vomiting)    Visual impairment    glasses              Past Surgical History:   Procedure Laterality Date          x2    Endometrial ablation      [place] teeth removed                  Social History     Socioeconomic History    Marital status:    Tobacco Use    Smoking status: Former     Current packs/day: 0.00     Average packs/day: 0.5 packs/day for 5.0 years (2.5 ttl pk-yrs)     Types: Cigarettes     Start date:      Quit date:      Years since quittin.1     Passive exposure: Never    Smokeless tobacco: Never   Vaping Use    Vaping status: Never Used   Substance and Sexual Activity    Alcohol use: Yes     Alcohol/week: 1.0 standard drink of alcohol     Types: 1 Glasses of wine per week     Comment: occ- very seldom    Drug use: Never    Sexual activity: Yes     Partners: Male   Other Topics Concern    Caffeine Concern Yes     Comment: 5-6 cups a day    Special Diet No    Exercise Yes     Comment: walking              Review of Systems    Positive for stated complaint: Cough - Really deep cough, sore throat and congestion  Other systems are as noted in HPI.  Constitutional and vital signs reviewed.      All other systems reviewed and negative except as noted  above.    Physical Exam     ED Triage Vitals [02/04/25 1037]   /73   Pulse 63   Resp 18   Temp 98.5 °F (36.9 °C)   Temp src Oral   SpO2 98 %   O2 Device None (Room air)       Current Vitals:   Vital Signs  BP: 120/73  Pulse: 63  Resp: 18  Temp: 98.5 °F (36.9 °C)  Temp src: Oral    Oxygen Therapy  SpO2: 98 %  O2 Device: None (Room air)        Physical Exam  Vitals and nursing note reviewed.   Constitutional:       Appearance: She is well-developed.   HENT:      Right Ear: Tympanic membrane normal.      Left Ear: Tympanic membrane normal.      Mouth/Throat:      Pharynx: Posterior oropharyngeal erythema present.   Cardiovascular:      Rate and Rhythm: Normal rate and regular rhythm.   Pulmonary:      Effort: Pulmonary effort is normal.      Breath sounds: Normal breath sounds.      Comments: Congested cough  Musculoskeletal:      Cervical back: Normal range of motion.   Skin:     General: Skin is warm and dry.   Neurological:      Mental Status: She is alert and oriented to person, place, and time.             ED Course     Labs Reviewed   POCT RAPID STREP - Normal                   MDM        Differentials include but not limited to influenza versus URI versus COVID versus pneumonia  Strep neg  COVID at home neg  CXR neg  Supportive care: Run humidifier, increase fluids, elevate HOB, NSAIDs, Tylenol frequent nasal clearing, saline spray/steam showers. Educated on worsening signs and symptoms of illness.   Decongestants Mucinex/expectorant  Close PMD follow-up advised if sx persist  All vital signs are stable/sats appropriate on room air  Plan dc home to Follow-up with pmd/return to the immediate care for any new or worsening symptoms at any time              Medical Decision Making  Problems Addressed:  Productive cough: acute illness or injury  Upper respiratory tract infection, unspecified type: acute illness or injury    Amount and/or Complexity of Data Reviewed  Radiology: ordered and independent  interpretation performed. Decision-making details documented in ED Course.    Risk  OTC drugs.  Prescription drug management.        Disposition and Plan     Clinical Impression:  1. Productive cough    2. Upper respiratory tract infection, unspecified type         Disposition:  Discharge  2/4/2025 11:47 am    Follow-up:  Lucia Benitez MD  [address] [place] [id number]  [phone]    Schedule an appointment as soon as possible for a visit   As needed          Medications Prescribed:  Discharge Medication List as of 2/4/2025 11:58 AM        START taking these medications    Details   benzonatate (TESSALON PERLES) 100 MG Oral Cap Take 1 capsule (100 mg total) by mouth 3 (three) times daily as needed for cough., Normal, Disp-14 capsule, R-0      albuterol 108 (90 Base) MCG/ACT Inhalation Aero Soln Inhale 2 puffs into the lungs every 4 (four) hours as needed for Wheezing., Normal, Disp-1 each, R-0                 Supplementary Documentation:

## 2025-02-04 NOTE — ED INITIAL ASSESSMENT (HPI)
Pt c/o 3 days of cough. Pt states today she started to have a sore throat. Pt states she had a negative home covid test 2 days ago.

## 2025-02-19 ENCOUNTER — TELEPHONE (OUTPATIENT)
Dept: FAMILY MEDICINE CLINIC | Facility: CLINIC | Age: 50
End: 2025-02-19

## 2025-02-19 DIAGNOSIS — R79.89 ABNORMAL TSH: ICD-10-CM

## 2025-02-19 DIAGNOSIS — E06.3 HYPOTHYROIDISM, ACQUIRED, AUTOIMMUNE: ICD-10-CM

## 2025-02-21 NOTE — TELEPHONE ENCOUNTER
Lvm for patient to call back to confirm if she would like to keep Dr Benitez as her pcp or if she would like to switch to Dr Watt.

## 2025-02-21 NOTE — TELEPHONE ENCOUNTER
Requested Prescriptions     Pending Prescriptions Disp Refills    LEVOTHYROXINE 175 MCG Oral Tab [Pharmacy Med Name: Levothyroxine Sodium 175 Mcg Tab Lupi] 90 tablet 0     Sig: TAKE ONE TABLET BY MOUTH EVERY MORNING BEFORE BREAKFAST     Last refill not since 9/1/23 #90  LOV 5/16/24  No future appointments.  RTC 5/16/25  Lab Results   Component Value Date    T4F 0.4 (L) 05/16/2024    TSH >100.000 (H) 05/16/2024

## 2025-02-24 RX ORDER — LEVOTHYROXINE SODIUM 175 UG/1
175 TABLET ORAL
Qty: 30 TABLET | Refills: 0 | Status: SHIPPED | OUTPATIENT
Start: 2025-02-24

## 2025-02-24 NOTE — TELEPHONE ENCOUNTER
Patient called and confirm she wants to keep Dr. Benitez as her PCP. I informed patient going forward. Dr. Benitez will be the provider she will see for her physicals and med checks. Patient understood.     Please advise refill request below.

## 2025-02-24 NOTE — TELEPHONE ENCOUNTER
Thank you for the update. Should schedule physical. Due for repeat TSH, ASAP due to last TSH was >100. Thank you.

## 2025-02-24 NOTE — TELEPHONE ENCOUNTER
Left message to call back. Please inform of Dr. Benitez's note below and schedule for physical. Thank you.

## 2025-02-24 NOTE — TELEPHONE ENCOUNTER
2nd attempt lvm to let the patient know in order to ref ill the prescription , she needs to call back to confirm who she wants to establish care with. I explained if she wants to keep Dr Benitez as her PCP or if she wants to establish care with Dr Watt. I advised the patient in order for the medication to be refilled and signed off she needs to call the office to let the office know the above information.

## 2025-02-26 ENCOUNTER — LABORATORY ENCOUNTER (OUTPATIENT)
Dept: LAB | Age: 50
End: 2025-02-26
Attending: STUDENT IN AN ORGANIZED HEALTH CARE EDUCATION/TRAINING PROGRAM
Payer: COMMERCIAL

## 2025-02-26 DIAGNOSIS — R79.89 ABNORMAL TSH: ICD-10-CM

## 2025-02-26 DIAGNOSIS — E06.3 HYPOTHYROIDISM, ACQUIRED, AUTOIMMUNE: ICD-10-CM

## 2025-02-26 LAB
T4 FREE SERPL-MCNC: 0.8 NG/DL (ref 0.8–1.7)
TSI SER-ACNC: 4.36 UIU/ML (ref 0.55–4.78)

## 2025-02-26 PROCEDURE — 84439 ASSAY OF FREE THYROXINE: CPT | Performed by: STUDENT IN AN ORGANIZED HEALTH CARE EDUCATION/TRAINING PROGRAM

## 2025-02-26 PROCEDURE — 84443 ASSAY THYROID STIM HORMONE: CPT | Performed by: STUDENT IN AN ORGANIZED HEALTH CARE EDUCATION/TRAINING PROGRAM

## 2025-03-24 DIAGNOSIS — E06.3 HYPOTHYROIDISM, ACQUIRED, AUTOIMMUNE: ICD-10-CM

## 2025-03-24 DIAGNOSIS — R79.89 ABNORMAL TSH: ICD-10-CM

## 2025-03-25 RX ORDER — LEVOTHYROXINE SODIUM 175 UG/1
175 TABLET ORAL
Qty: 30 TABLET | Refills: 0 | Status: SHIPPED | OUTPATIENT
Start: 2025-03-25

## 2025-03-25 NOTE — TELEPHONE ENCOUNTER
Last Office Visit: 5/16/24  Last Refill: 2/24/25  Return to Clinic:   Protocol: passed  NOV: n/a   Requested Prescriptions     Pending Prescriptions Disp Refills    LEVOTHYROXINE 175 MCG Oral Tab [Pharmacy Med Name: Levothyroxine Sodium 175 Mcg Tab Lupi] 30 tablet 0     Sig: TAKE ONE TABLET BY MOUTH EVERY MORNING BEFORE BREAKFAST           Please approve if appropriate.     Thank you!

## 2025-04-27 DIAGNOSIS — E06.3 HYPOTHYROIDISM, ACQUIRED, AUTOIMMUNE: ICD-10-CM

## 2025-04-27 DIAGNOSIS — R79.89 ABNORMAL TSH: ICD-10-CM

## 2025-05-05 RX ORDER — LEVOTHYROXINE SODIUM 175 UG/1
175 TABLET ORAL
Qty: 30 TABLET | Refills: 0 | OUTPATIENT
Start: 2025-05-05

## 2025-07-18 ENCOUNTER — HOSPITAL ENCOUNTER (OUTPATIENT)
Age: 50
Discharge: HOME | End: 2025-07-18
Payer: COMMERCIAL

## 2025-07-18 ENCOUNTER — APPOINTMENT (OUTPATIENT)
Dept: GENERAL RADIOLOGY | Age: 50
End: 2025-07-18
Attending: NURSE PRACTITIONER
Payer: COMMERCIAL

## 2025-07-18 VITALS
DIASTOLIC BLOOD PRESSURE: 70 MMHG | TEMPERATURE: 98 F | RESPIRATION RATE: 18 BRPM | OXYGEN SATURATION: 100 % | SYSTOLIC BLOOD PRESSURE: 116 MMHG | HEART RATE: 50 BPM

## 2025-07-18 DIAGNOSIS — S46.912A SHOULDER STRAIN, LEFT, INITIAL ENCOUNTER: Primary | ICD-10-CM

## 2025-07-18 PROCEDURE — 99213 OFFICE O/P EST LOW 20 MIN: CPT

## 2025-07-18 PROCEDURE — 90471 IMMUNIZATION ADMIN: CPT

## 2025-07-18 PROCEDURE — 73030 X-RAY EXAM OF SHOULDER: CPT | Performed by: NURSE PRACTITIONER

## 2025-07-18 NOTE — ED PROVIDER NOTES
Patient Seen in: [facility]      History  No chief complaint on file.    Stated Complaint: Fall; Arm Pain    Subjective:   49-year-old female presents to immediate care for left shoulder pain.  Patient states she was walking her dog yesterday when she stumbled her dog pulled out her leash she fell forward on outstretched left hand.  Patient is left-hand-dominant, denies striking her head.  Patient does report that she fell onto her knees and noted some abrasions, she was wearing pants at the              Objective:     Past Medical History:    Anesthesia complication    woke up in a dental procedure once    Anxiety state    Autism spectrum disorder (HCC)    Back problem    Disorder of thyroid    Hx of motion sickness    Migraines    Osteoarthritis    PONV (postoperative nausea and vomiting)    Visual impairment    glasses              Past Surgical History:   Procedure Laterality Date          x2    Endometrial ablation      [place] teeth removed                  Social History     Socioeconomic History    Marital status:    Tobacco Use    Smoking status: Former     Current packs/day: 0.00     Average packs/day: 0.5 packs/day for 5.0 years (2.5 ttl pk-yrs)     Types: Cigarettes     Start date:      Quit date:      Years since quittin.5     Passive exposure: Never    Smokeless tobacco: Never   Vaping Use    Vaping status: Never Used   Substance and Sexual Activity    Alcohol use: Yes     Alcohol/week: 1.0 standard drink of alcohol     Types: 1 Glasses of wine per week     Comment: occ- very seldom    Drug use: Never    Sexual activity: Yes     Partners: Male   Other Topics Concern    Caffeine Concern Yes     Comment: 5-6 cups a day    Special Diet No    Exercise Yes     Comment: walking              Review of Systems    Positive for stated complaint: Fall; Arm Pain  Other systems are as noted in HPI.  Constitutional and vital signs reviewed.      All other systems  reviewed and negative except as noted above.                  Physical Exam    ED Triage Vitals [07/18/25 0949]   /70   Pulse 50   Resp 18   Temp 98.3 °F (36.8 °C)   Temp src Oral   SpO2 100 %   O2 Device None (Room air)       Current Vitals:   Vital Signs  BP: 116/70  Pulse: 50  Resp: 18  Temp: 98.3 °F (36.8 °C)  Temp src: Oral    Oxygen Therapy  SpO2: 100 %  O2 Device: None (Room air)            Physical Exam  Vitals and nursing note reviewed.   Constitutional:       General: She is not in acute distress.  HENT:      Head: Normocephalic.   Cardiovascular:      Rate and Rhythm: Normal rate.   Pulmonary:      Effort: Pulmonary effort is normal.   Musculoskeletal:      Left shoulder: Tenderness present. Decreased range of motion.      Left upper arm: Normal.      Left elbow: Normal.      Left forearm: Normal.      Left wrist: Normal.        Arms:         Legs:    Skin:     General: Skin is warm and dry.   Neurological:      General: No focal deficit present.      Mental Status: She is alert and oriented to person, place, and time.                 ED Course  Labs Reviewed - No data to display  XR SHOULDER, COMPLETE (MIN 2 VIEWS), LEFT (CPT=73030)  Result Date: 7/18/2025  PROCEDURE: XR SHOULDER, COMPLETE (MIN 2 VIEWS), LEFT (CPT=73030) INDICATIONS: Shoulder pain/injury PATIENT STATED HISTORY: Patient states she fell last night while walking her dog.  She states her arm was tugged by the dogs leash.  She has pain in her shoulder that radiates down her arm. COMPARISON: There are no comparisons for this exam. FINDINGS: No evidence of acute displaced fracture or dislocation. Normal mineralization. Unremarkable soft tissues.     CONCLUSION: No evidence of acute displaced fracture or dislocation in the left shoulder. Electronically Verified and Signed by Attending Radiologist: Jalil Smith MD 7/18/2025 10:39 AM Workstation: [id number]       [facility]     Medical Decision Making  Pertinent Labs & Imaging studies reviewed.  (See chart for details).  Patient coming in with shoulder pain after a fall.   Differential diagnosis includes shoulder subluxation, fracture, sprain     Patient is comfortable with plan of care.     Overall Pt looks good. Non-toxic, well-hydrated and in no respiratory distress. Vital signs are reassuring. Exam is reassuring. I do not believe pt requires and additional diagnostic studies or intervention. I believe pt can be discharged home to continue evaluation as an outpatient. Follow-up provider given. Discharge instructions given and reviewed. Return for any problems. All understand and agrees with the plan.        Problems Addressed:  Shoulder strain, left, initial encounter: acute illness or injury    Amount and/or Complexity of Data Reviewed  Radiology: ordered. Decision-making details documented in ED Course.    Risk  OTC drugs.        Disposition and Plan     Clinical Impression:  1. Shoulder strain, left, initial encounter         Disposition:  Discharge  7/18/2025 10:49 am    Follow-up:  Lucia Benitez MD  [address] [place] [id number]  [phone]          [facility], [address] [place] [id number]  [phone]              Medications Prescribed:  Discharge Medication List as of 7/18/2025 10:49 AM                Supplementary Documentation:

## 2025-07-18 NOTE — ED INITIAL ASSESSMENT (HPI)
Pt states she fell last night while walking her dog, left arm was pulled by leash. Has pain on left shoulder and radiates down to elbow.

## 2025-07-21 ENCOUNTER — OFFICE VISIT (OUTPATIENT)
Dept: ORTHOPEDICS CLINIC | Facility: CLINIC | Age: 50
End: 2025-07-21
Payer: COMMERCIAL

## 2025-07-21 VITALS — HEIGHT: 70 IN | BODY MASS INDEX: 24.48 KG/M2 | WEIGHT: 171 LBS

## 2025-07-21 DIAGNOSIS — S40.012A CONTUSION OF LEFT SHOULDER, INITIAL ENCOUNTER: ICD-10-CM

## 2025-07-21 DIAGNOSIS — S43.402A SPRAIN OF LEFT SHOULDER, UNSPECIFIED SHOULDER SPRAIN TYPE, INITIAL ENCOUNTER: Primary | ICD-10-CM

## 2025-07-21 PROCEDURE — 99214 OFFICE O/P EST MOD 30 MIN: CPT | Performed by: ORTHOPAEDIC SURGERY

## 2025-07-21 PROCEDURE — 3008F BODY MASS INDEX DOCD: CPT | Performed by: ORTHOPAEDIC SURGERY

## 2025-07-21 RX ORDER — MELOXICAM 15 MG/1
15 TABLET ORAL DAILY
Qty: 14 TABLET | Refills: 1 | Status: SHIPPED | OUTPATIENT
Start: 2025-07-21

## 2025-07-21 NOTE — H&P
[facility] - ORTHOPEDICS  [address] [place] [id number]  [phone]       Name: Preis, Corinne   MRN: [id number]  Date: 2025     CC: Left shoulder pain.    REFERRED BY: Lucia Benitez MD    HPI:   Corinne Edith Preis is a very pleasant 49 year old right-hand dominant female who presents today for evaluation, consultation, and management of a left shoulder injury that occurred after she was walking her dog on 2025 and stumbled when her dog pulled on the leash and she fell forward on the outstretched left hand.  She had immediate pain and presented to the immediate care blood work in which they referred to our service for further evaluation and management. Rated pain is 4/10 with weakness and stiffness. Certain ROM and overuse of her arm really bother her.     Of note she is s/p right shoulder arthroscopic rotator cuff repair, biceps tenodesis, subacromial decompression, extensive debridement distal clavicle excision on 2023.      PMH:   Past Medical History[1]    PAST SURGICAL HX:  Past Surgical History[2]    FAMILY HX:  Family History[3]    ALLERGIES:  Other, [place] oil, Lac bovis, Milk, Peanut-containing drug products, Tree nuts, Peanuts, and Tree extract    MEDICATIONS: Current Medications[4]    ROS: A comprehensive 14 point review of systems was performed and was negative aside from the aforementioned per history of present illness.    SOCIAL HX:  Social History     Occupational History    Not on file   Tobacco Use    Smoking status: Former     Current packs/day: 0.00     Average packs/day: 0.5 packs/day for 5.0 years (2.5 ttl pk-yrs)     Types: Cigarettes     Start date:      Quit date: 2004     Years since quittin.5     Passive exposure: Never    Smokeless tobacco: Never   Vaping Use    Vaping status: Never Used   Substance and Sexual Activity    Alcohol use: Yes     Alcohol/week: 1.0 standard drink of alcohol     Types: 1 Glasses of  wine per week     Comment: occ- very seldom    Drug use: Never    Sexual activity: Yes     Partners: Male        PE:   Vitals:    07/21/25 0928   Weight: 171 lb (77.6 kg)   Height: 5' 10\" (1.778 m)     Estimated body mass index is 24.54 kg/m² as calculated from the following:    Height as of this encounter: 5' 10\" (1.778 m).    Weight as of this encounter: 171 lb (77.6 kg).    Physical Exam  Constitutional:       Appearance: Normal appearance.   HENT:      Head: Normocephalic and atraumatic.   Eyes:      Extraocular Movements: Extraocular movements intact.   Neck:      Musculoskeletal: Normal range of motion and neck supple.   Cardiovascular:      Pulses: Normal pulses.   Pulmonary:      Effort: Pulmonary effort is normal. No respiratory distress.   Abdominal:      General: There is no distension.   Skin:     General: Skin is warm.      Capillary Refill: Capillary refill takes less than 2 seconds.      Findings: No bruising.   Neurological:      General: No focal deficit present.      Mental Status: Alert.   Psychiatric:         Mood and Affect: Mood normal.     Examination of the left shoulder demonstrates:     Skin is intact, warm and dry.   Cervical:  Full ROM  Spurling's  Negative    Deformity:   none  Atrophy:   none    Scapular winging: Negative    Palpation:     AC Joint  Negative  Biceps Tendon  Negative  Greater Tuberosity Negative    ROM:   Forward Flexion:  150°  Abduction:   full and symmetric  External Rotation:  full and symmetric  Internal Rotation:  full and symmetric    Rotator Cuff Strength: Subtle discomfort  Supraspinatus:   5/5  Subscapularis:   5/5  Infraspinatus/Teres: 5/5    Provocative Tests:   [place]:   Negative  Speed's:   Negative  Catoosa's:   Equivocal  Lift-off:    Negative  Apprehension:  Negative  Sulcus Sign:   Negative    Neurovascular Upper Extremity (Bilateral)  Motor:    5/5 EPL, Finger Abduction, , Pinch, Deltoid  Sensation:   intact to light touch median, ulnar, radial  and axillary nerve  Circulation:   Normal, 2+ radial pulse    The contralateral upper extremity is without limitation in range of motion or strength, no positive provocative maneuvers.     Radiographic Examination/Diagnostics:    I personally viewed, independently interpreted and radiology report was reviewed.    XR SHOULDER, COMPLETE (MIN 2 VIEWS), LEFT (CPT=73030)  Result Date: 7/18/2025  PROCEDURE: XR SHOULDER, COMPLETE (MIN 2 VIEWS), LEFT (CPT=73030)   INDICATIONS: Shoulder pain/injury   PATIENT STATED HISTORY: Patient states she fell last night while walking her dog.  She states her arm was tugged by the dogs leash.  She has pain in her shoulder that radiates down her arm.   COMPARISON: There are no comparisons for this exam.   FINDINGS: No evidence of acute displaced fracture or dislocation. Normal mineralization. Unremarkable soft tissues.     CONCLUSION: No evidence of acute displaced fracture or dislocation in the left shoulder.   Electronically Verified and Signed by Attending Radiologist: Jalil Smith MD 7/18/2025 10:39 AM Workstation: [id number]      IMPRESSION: Corinne Edith Preis is a 49 year old Left hand dominant female with left shoulder injury consistent with a sprain and contusion of the left shoulder.     We elected to maximize conservative management with physical therapy. We discussed continued rehabilitation with initiation of oral Meloxicam as an anti-inflammatory aid.    PLAN:   We had a detailed discussion outlining the etiology, anatomy, pathophysiology, and natural history of the patient's findings. Imaging was reviewed in detail and correlated to a 3-dimensional model of the patient's pathology.     Provided note for transitional duty for 4 weeks.     We reviewed the treatment of this disease condition.  Fortunately, treatment is amenable to conservative treatment which we chose to optimize at today's visit.  We recommended physical therapy to aid in strengthening, range of motion,  functional improvement, and return to baseline activity.  The patient had the opportunity to ask questions and all questions were answered appropriately.      FOLLOW-UP:   Return to clinic in four weeks. No imaging required at next visit.       Daya Amato MD  Knee, Shoulder, & Elbow Surgery / Sports Medicine Specialist  Orthopaedic Surgery  [address] [place] [address]   [email or web address]  [email or web address]  t: [phone]  o: [phone]  f: [phone]    I, Milla Weston, attest that this documentation has been prepared under the direction and in the presence of Dr. Daya Amato MD.             [1]   Past Medical History:   Anesthesia complication    woke up in a dental procedure once    Anxiety state    Autism spectrum disorder (HCC)    Back problem    Disorder of thyroid    Hx of motion sickness    Migraines    Osteoarthritis    PONV (postoperative nausea and vomiting)    Visual impairment    glasses   [2]   Past Surgical History:  Procedure Laterality Date          x2    Endometrial ablation      [place] teeth removed     [3]   Family History  Problem Relation Age of Onset    Allergies Father     Breast Cancer Mother 68    Allergies Mother     Breast Cancer Maternal Grandmother 30        1st age 30 age 60    Allergies Brother     Breast Cancer Maternal Aunt         post shaji   [4]   Current Outpatient Medications   Medication Sig Dispense Refill    LEVOTHYROXINE 175 MCG Oral Tab TAKE ONE TABLET BY MOUTH EVERY MORNING BEFORE BREAKFAST 30 tablet 0    benzonatate (TESSALON PERLES) 100 MG Oral Cap Take 1 capsule (100 mg total) by mouth 3 (three) times daily as needed for cough. 14 capsule 0    azelastine 0.1 % Nasal Solution by Nasal route 2 (two) times daily.      triamcinolone 0.1 % External Cream Apply topically 2 (two) times daily as needed. 30 g 0    levocetirizine 5 MG Oral Tab Take 1 tablet (5 mg total) by mouth in the morning and 1 tablet (5 mg total) before  bedtime. 180 tablet 1    EPINEPHrine 0.3 MG/0.3ML Injection Solution Auto-injector       gabapentin 400 MG Oral Cap Take 2 capsules (800 mg total) by mouth 4 (four) times daily. 2 pills in the morning. 2 pills at night.      Therapeutic Multivit/Mineral Oral Tab Take 1 tablet by mouth daily.      QUEtiapine 25 MG Oral Tab Take 2 tablets (50 mg total) by mouth nightly.      sertraline 100 MG Oral Tab Take 1.5 tablets (150 mg total) by mouth daily.

## 2025-07-25 ENCOUNTER — TELEPHONE (OUTPATIENT)
Dept: ORTHOPEDICS CLINIC | Facility: CLINIC | Age: 50
End: 2025-07-25

## 2025-07-29 ENCOUNTER — PATIENT MESSAGE (OUTPATIENT)
Dept: ORTHOPEDICS CLINIC | Facility: CLINIC | Age: 50
End: 2025-07-29

## 2025-07-29 DIAGNOSIS — S40.012A CONTUSION OF LEFT SHOULDER, INITIAL ENCOUNTER: Primary | ICD-10-CM

## 2025-08-11 ENCOUNTER — OFFICE VISIT (OUTPATIENT)
Dept: ORTHOPEDICS CLINIC | Facility: CLINIC | Age: 50
End: 2025-08-11

## 2025-08-11 DIAGNOSIS — M25.812 IMPINGEMENT OF LEFT SHOULDER: Primary | ICD-10-CM

## 2025-08-11 DIAGNOSIS — M75.112 NONTRAUMATIC INCOMPLETE TEAR OF LEFT ROTATOR CUFF: ICD-10-CM

## 2025-08-11 DIAGNOSIS — M75.82 ROTATOR CUFF TENDINITIS, LEFT: ICD-10-CM

## 2025-08-11 PROCEDURE — 99214 OFFICE O/P EST MOD 30 MIN: CPT | Performed by: PHYSICIAN ASSISTANT

## 2025-08-20 ENCOUNTER — MED REC SCAN ONLY (OUTPATIENT)
Dept: ORTHOPEDICS CLINIC | Facility: CLINIC | Age: 50
End: 2025-08-20

## 2025-08-22 ENCOUNTER — PATIENT MESSAGE (OUTPATIENT)
Dept: ORTHOPEDICS CLINIC | Facility: CLINIC | Age: 50
End: 2025-08-22

## 2025-08-22 ENCOUNTER — TELEPHONE (OUTPATIENT)
Dept: ORTHOPEDICS CLINIC | Facility: CLINIC | Age: 50
End: 2025-08-22

## (undated) DEVICE — 3M™ IOBAN™ 2 ANTIMICROBIAL INCISE DRAPE 6648EZ: Brand: IOBAN™ 2

## (undated) DEVICE — DERMABOND CLOSURE 0.7ML TOPICL

## (undated) DEVICE — DRAPE,U/SHT,SPLIT,FILM,60X84,STERILE: Brand: MEDLINE

## (undated) DEVICE — GOWN,SIRUS,FABRIC-REINFORCED,X-LARGE: Brand: MEDLINE

## (undated) DEVICE — COVER,MAYO STAND,STERILE: Brand: MEDLINE

## (undated) DEVICE — SLEEVE KENDALL SCD EXPRESS MED

## (undated) DEVICE — STOCK SURG LG 48X9IN

## (undated) DEVICE — [TOMCAT CUTTER, ARTHROSCOPIC SHAVER BLADE,  DO NOT RESTERILIZE,  DO NOT USE IF PACKAGE IS DAMAGED,  KEEP DRY,  KEEP AWAY FROM SUNLIGHT]: Brand: FORMULA

## (undated) DEVICE — 40765 BEACH CHAIR HEAD RESTRAINT: Brand: 40765 BEACH CHAIR HEAD RESTRAINT

## (undated) DEVICE — CANNULA 8.5MM TWIST AR-6530

## (undated) DEVICE — TUBING DW OUTFLOW

## (undated) DEVICE — KIT TRC TRIMANO BEACH CHR ARM

## (undated) DEVICE — SHEET,DRAPE,70X100,STERILE: Brand: MEDLINE

## (undated) DEVICE — ALCOHOL 70% 4 OZ

## (undated) DEVICE — [RESECTOR CUTTER, ARTHROSCOPIC SHAVER BLADE,  DO NOT RESTERILIZE,  DO NOT USE IF PACKAGE IS DAMAGED,  KEEP DRY,  KEEP AWAY FROM SUNLIGHT]: Brand: FORMULA

## (undated) DEVICE — SUT MONOCRYL 3-0 PS-2 Y427H

## (undated) DEVICE — MEDI-VAC NON-CONDUCTIVE SUCTION TUBING: Brand: CARDINAL HEALTH

## (undated) DEVICE — LIGHT HANDLE

## (undated) DEVICE — SUPER TURBOVAC 90 IFS: Brand: COBLATION

## (undated) DEVICE — STERILE POLYISOPRENE POWDER-FREE SURGICAL GLOVES: Brand: PROTEXIS

## (undated) DEVICE — SUT TIGERLOOP 2 AR-7234T

## (undated) DEVICE — COVER LIGHT HANDLE RIGID GREEN

## (undated) DEVICE — SUT FIBERTAPE 2 AR-7237

## (undated) DEVICE — SUT MONOCRYL 2-0 SH Y417H

## (undated) DEVICE — GAUZE TRAY STERILE 4X4 12PLY

## (undated) DEVICE — TUBING IRR 16FT CNT WV 3 ASCP

## (undated) DEVICE — 3M™ TEGADERM™ +PAD FILM DRESSING WITH NON-ADHERENT PAD, 3587, 3-1/2 IN X 4-1/8 IN (9 CM X 10.5 CM), 25/CAR, 4 CAR/CS: Brand: 3M™ TEGADERM™

## (undated) DEVICE — 3M™ STERI-STRIP™ REINFORCED ADHESIVE SKIN CLOSURES, R1547, 1/2 IN X 4 IN (12 MM X 100 MM), 6 STRIPS/ENVELOPE: Brand: 3M™ STERI-STRIP™

## (undated) DEVICE — STERILE POLYISOPRENE POWDER-FREE SURGICAL GLOVES WITH EMOLLIENT COATING: Brand: PROTEXIS

## (undated) DEVICE — #15 STERILE STAINLESS BLADE: Brand: STERILE STAINLESS BLADES

## (undated) DEVICE — SHOULDER ARTHROSCOPY CDS-LF: Brand: MEDLINE INDUSTRIES, INC.

## (undated) DEVICE — SUTURE PASSER AR-4068-90

## (undated) DEVICE — 3M™ TEGADERM™ +PAD FILM DRESSING WITH NON-ADHERENT PAD, 3584, 2-3/8 IN X 4 IN (6 CM X 10 CM), 50/CAR, 4 CAR/CS: Brand: 3M™ TEGADERM™

## (undated) DEVICE — 1000 S-DRAPE TOWEL DRAPE 10/BX: Brand: STERI-DRAPE™

## (undated) DEVICE — SOLUTION  .9 3000ML

## (undated) DEVICE — PAD SACRAL PREMIUM 12X12X1

## (undated) NOTE — LETTER
Date: 7/28/2023    Patient Name: Lela Green          To Whom it may concern: This letter has been written at the patient's request. The above patient was seen at the [facility] for treatment of a medical condition. She may return to work without restrictions. Sincerely,        Miki Rogers. Kyler Oliva MD  Knee, Shoulder, & Elbow Surgery / Sports Medicine Specialist  [facility] Orthopaedic Surgery  [address] [place], [place] [address]   Maddie [email or web address]  t: [phone]  o: [phone]  f: [phone]

## (undated) NOTE — LETTER
Date: 2/1/2023    Patient Name: Kandis Garvin          To Whom it may concern: This letter has been written at the patient's request. The above patient was seen at one of the [facility] for treatment of a medical condition. The patient may return to work with a 15 lb lifting restriction. Re-evaluated in 6 weeks. Sincerely,        Brumley Friday. Lluvia Lorenzo MD  Knee, Shoulder, & Elbow Surgery / Sports Medicine Specialist  [facility]  [facility] [place] , [facility]. [email or web address]  t: [phone]  o: [phone]  f: [phone]

## (undated) NOTE — LETTER
Date: 7/21/2025    Patient Name: Corinne Edith Preis          To Whom it may concern:    This letter has been written at the patient's request. The above patient was seen at [facility] for treatment of a medical condition.    This patient should be excused from using the left upper extremity for work activities. 5 lbs lifting restriction. Will be reassessed in 4 weeks.       Sincerely,        Daya Amato MD  Knee, Shoulder, & Elbow Surgery / Sports Medicine Specialist  Orthopaedic Surgery  [address] [place] [address].[email or web address]  [email or web address]  t: [phone]  o: [phone]  f: [phone]

## (undated) NOTE — LETTER
Date & Time: 9/9/2024, 2:57 PM  Patient: Corinne Edith Preis  Encounter Provider(s):    Melissa Metzger APRN       To Whom It May Concern:    Corinne Preis was seen and treated in our department on 9/9/2024. She should not lift at work until follow up with orthopedic.    If you have any questions or concerns, please do not hesitate to call.        ________S.Yassine_____________________  Physician/APC Signature

## (undated) NOTE — LETTER
Date: 1/30/2023    Patient Name: Stephen Ritter          To Whom it may concern: This letter has been written at the patient's request. The above patient was seen at the [facility] for treatment of a medical condition. This patient should be out of work until further notice. Sincerely,          LISSETT Valadez, ERICK Orthopedic Surgery / Sports Medicine Specialist  [facility]  [address] , [place] [address]   [facility]. [facility]  [email or web address]  t: [phone]  o: [phone]  f: [phone]          This note was dictated using Dragon software. While it was briefly proofread prior to completion, some grammatical, spelling, and word choice errors due to dictation may still occur.

## (undated) NOTE — LETTER
Date & Time: 7/18/2025, 10:49 AM  Patient: Corinne Edith Preis  Encounter Provider(s):    Carrol Gamble APRN       To Whom It May Concern:    Corinne Preis was seen and treated in our department on 7/18/2025. She should not return to work until 07/22/2025.    If you have any questions or concerns, please do not hesitate to call.        Carrol Gamble NP-C  Nurse Practitioner

## (undated) NOTE — LETTER
Date: 3/27/2023    Patient Name: Celestina Blanca          To Whom it may concern:    Patient has been under my care for right shoulder work-related injury and has been unable to return to work due to work-related restrictions of lifting. Notably, after initial evaluation she was given a 6-week course of restriction and will be having surgery on 3/28/2023. This results in a lapse of FMLA from 3/17/2023 to 3/28/2023. I would like to emphasize that no changes have been noted in the patient's pathology and it requires surgical intervention. As such, previous restrictions apply for this time duration from 3/17/2023 to 3/28/2023. If there are continued concerns regarding this, please feel free to reach out to me directly. Sincerely,        Sharath China. Stuart Bautista MD  Knee, Shoulder, & Elbow Surgery / Sports Medicine Specialist  [facility]  [facility] [address], [place] , [facility]. [email or web address]  t: [phone]  o: [phone]  f: [phone]